# Patient Record
Sex: FEMALE | Race: WHITE | NOT HISPANIC OR LATINO | Employment: FULL TIME | URBAN - METROPOLITAN AREA
[De-identification: names, ages, dates, MRNs, and addresses within clinical notes are randomized per-mention and may not be internally consistent; named-entity substitution may affect disease eponyms.]

---

## 2019-11-18 ENCOUNTER — EVALUATION (OUTPATIENT)
Dept: PHYSICAL THERAPY | Facility: CLINIC | Age: 39
End: 2019-11-18
Payer: COMMERCIAL

## 2019-11-18 DIAGNOSIS — R51.9 LEFT FACIAL PAIN: Primary | ICD-10-CM

## 2019-11-18 DIAGNOSIS — M26.609 TMJ DYSFUNCTION: ICD-10-CM

## 2019-11-18 PROCEDURE — 97140 MANUAL THERAPY 1/> REGIONS: CPT

## 2019-11-18 PROCEDURE — 97161 PT EVAL LOW COMPLEX 20 MIN: CPT

## 2019-11-18 PROCEDURE — 97110 THERAPEUTIC EXERCISES: CPT

## 2019-11-18 RX ORDER — LEVOTHYROXINE SODIUM 0.03 MG/1
25 TABLET ORAL DAILY
COMMUNITY

## 2019-11-18 NOTE — LETTER
2019    Raphael Ramon DDS  [address]    Patient: Laverne Brooks   YOB: 1980   Date of Visit: 2019     Encounter Diagnosis     ICD-10-CM    1  Left facial pain R51    2  TMJ dysfunction M26 609        Dear Dr Ifeanyi Graham:    Thank you for your recent referral of Laverne Brooks  Please review the attached evaluation summary from Porsche's recent visit  Please verify that you agree with the plan of care by signing the attached order  If you have any questions or concerns, please do not hesitate to call  I sincerely appreciate the opportunity to share in the care of one of your patients and hope to have another opportunity to work with you in the near future  Sincerely,    Arielle Mares, PT      Referring Provider:      I certify that I have read the below Plan of Care and certify the need for these services furnished under this plan of treatment while under my care  Raphael Ramon DDS  [place]  [address]: [phone]          PT Evaluation     Today's date: 2019  Patient name: Laverne Brooks  : 1980  MRN: [id number]  Referring provider: LIA Luna  Dx:   Encounter Diagnosis     ICD-10-CM    1  Left facial pain R51    2  TMJ dysfunction M26 609        Start Time: 36  Stop Time: 1730  Total time in clinic (min): 60 minutes    Assessment  Assessment details: Patient is a 36 yo female presenting to physical therapy with symptoms consistent with L sided TMJ dysfunction/fascial pain that started a few months ago  Patient presents with poor posture, decreased shoulder and scapular strength, restrictions in TMJ opening and lateral deviations to the R and decreased muscular endurance  Patient has increased pain with chewing tough/hard foods, repetitive chewing, yawning, increased opening at the mandible and chewing gum   PT educated the patient on proper posture mechanics and in proper posture the patient notes less jaw pain  PT will address the noted impairments by performing cervical and TMJ strengthening, stretching, posture training, functional activities and manual techniques to allow the patient to return to her PLOF  PT recommended 2x/week for 4-6 weeks c a good prognosis 2* PLOF  Impairments: abnormal or restricted ROM, activity intolerance, impaired physical strength, lacks appropriate home exercise program, pain with function, poor posture  and poor body mechanics    Symptom irritability: lowUnderstanding of Dx/Px/POC: good   Prognosis: good    Goals  STG: In four weeks the patient will:    1  Be (I) with her HEP  2  Increase shoulder and scapular strength to 4+/5 MMT score to assist c ADLs  3  Increase jaw opening ROM by 5 degrees to assist c eating and flosssing  4  Performed proper posture for 75% of the session with 1-2 cues  LTG: In six weeks, the patient will:    1  Increase FOTO score to 89 to demonstrate improvements in symptoms and function  2  Demonstrate full jaw opening without pain  3  Perform 15 mins of chewing without pain  4  Increase shoulder and scapular strength to 5/5 MMT score to assist c prolonged activities  5  Performed proper posture for 100% of the session without cues         Plan  Patient would benefit from: skilled physical therapy  Referral necessary: No  Planned modality interventions: cryotherapy and thermotherapy: hydrocollator packs  Planned therapy interventions: abdominal trunk stabilization, manual therapy, joint mobilization, massage, neuromuscular re-education, patient education, postural training, strengthening, stretching, therapeutic activities, therapeutic exercise, home exercise program, functional ROM exercises and body mechanics training  Frequency: 2x week  Duration in visits: 12  Duration in weeks: 6  Plan of Care beginning date: 11/18/2019  Plan of Care expiration date: 2019  Treatment plan discussed with: patient        Subjective Evaluation    History of Present Illness  Mechanism of injury: Patient noted a hx of braces from  -   Patient noted popping and some jaw pain prior to braces  Patient noted a hx of a route canal prior to braces and pain 2* prolonged opening  Patient had braces off and the pain returned on the L side 9-10 months post  Patient has increased pain with chewing pizza crust and tougher pieces of meat  She also notes pain with opening her mouth to take a full bite, flossing, repetitive chewing, chewing gum and hard foods  No pain with brushing  Patient  wears her retainer every night and notes no hx of teeth grinding  She does feel her jaw clench throughout the day  Patient noted a few weeks ago her L shoulder is painful when she wakes up  Recurrent probem    Quality of life: good    Pain  Current pain ratin  At best pain ratin  At worst pain ratin  Location: L TMJ  Quality: sharp  Relieving factors: medications  Aggravating factors: eating  Progression: worsening    Social Support  Lives in: apartment  Lives with: young children (15 yo son)    Employment status: working ([employer])  Hand dominance: right  Exercise history: Exercise video, walking    Treatments  Treatments tried: Braces  Patient Goals  Patient goals for therapy: decreased pain, increased motion, increased strength, return to sport/leisure activities and independence with ADLs/IADLs  Patient goal: "to open without pain "        Objective     Postural Observations  Seated posture: poor  Standing posture: poor  Correction of posture: makes symptoms better    Additional Postural Observation Details  Improved posture decreases her jaw pain  PT educated the patient on a lumbar roll while seated       Neurological Testing     Sensation   Cervical/Thoracic   Left   Intact: light touch    Right   Intact: light touch    Active Range of Motion   Cervical/Thoracic Spine Cervical    Flexion:  Restriction level: minimal  Extension:  Restriction level: minimal  Left lateral flexion:  Restriction level: moderate  Right lateral flexion:  Restriction level moderate  Left rotation:  Restriction level: minimal  Right rotation:  Restriction level: minimal    Strength/Myotome Testing     Left Shoulder     Planes of Motion   Flexion: 4   Abduction: 4   External rotation at 0°:  4   Internal rotation at 0°:  4+     Right Shoulder     Planes of Motion   Flexion: 4   Abduction: 4   External rotation at 0°:  4   Internal rotation at 0°:  4+     Left Elbow   Flexion: 4+  Extension: 4+    Right Elbow   Flexion: 4+  Extension: 4+    Left Wrist/Hand   Wrist extension: 4+  Wrist flexion: 4+    Right Wrist/Hand   Wrist extension: 4+  Wrist flexion: 4+    General Comments:      Shoulder Comments   WNL  TMJ   Jaw observations: facial symmetry within normal limits  good oral hygiene  Joint sounds left: popping  Lateral bite test, right: pain on left  ROM: limited and pain with movement  Opening (mm): 36   Lateral excursion, left (mm): 20 and pain  Lateral excursion, right (mm)t: 10       Flowsheet Rows      Most Recent Value   PT/OT G-Codes   Current Score  89   Projected Score  88   FOTO information reviewed  Yes   Assessment Type  Evaluation   G code set  Self-Care             Precautions: none      Manual  11/18            TMJ distraction and ant mob MW  grade II            TMJ STM (ptyergoids) MW            Cervical upglide/downglide nv            Sub-occipital release nv                             Exercise Diary  11/18            UBE  nv            pec stretch 5x15"            Open books 5x15" ea            Scapular retraction nv            Chin tuck nv            Serratus punch nv            sidelying ER nv            TA nv            TA+ hip add nv            TA + hip abd nv Modalities  11/18            HP/CP PRN np

## 2019-11-18 NOTE — PROGRESS NOTES
PT Evaluation     Today's date: 2019  Patient name: Diogo Clarke  : 1980  MRN: [id number]  Referring provider: LIA Thayer  Dx:   Encounter Diagnosis     ICD-10-CM    1  Left facial pain R51    2  TMJ dysfunction M26 609        Start Time: 36  Stop Time: 1730  Total time in clinic (min): 60 minutes    Assessment  Assessment details: Patient is a 38 yo female presenting to physical therapy with symptoms consistent with L sided TMJ dysfunction/fascial pain that started a few months ago  Patient presents with poor posture, decreased shoulder and scapular strength, restrictions in TMJ opening and lateral deviations to the R and decreased muscular endurance  Patient has increased pain with chewing tough/hard foods, repetitive chewing, yawning, increased opening at the mandible and chewing gum  PT educated the patient on proper posture mechanics and in proper posture the patient notes less jaw pain  PT will address the noted impairments by performing cervical and TMJ strengthening, stretching, posture training, functional activities and manual techniques to allow the patient to return to her PLOF  PT recommended 2x/week for 4-6 weeks c a good prognosis 2* PLOF  Impairments: abnormal or restricted ROM, activity intolerance, impaired physical strength, lacks appropriate home exercise program, pain with function, poor posture  and poor body mechanics    Symptom irritability: lowUnderstanding of Dx/Px/POC: good   Prognosis: good    Goals  STG: In four weeks the patient will:    1  Be (I) with her HEP  2  Increase shoulder and scapular strength to 4+/5 MMT score to assist c ADLs  3  Increase jaw opening ROM by 5 degrees to assist c eating and flosssing  4  Performed proper posture for 75% of the session with 1-2 cues  LTG: In six weeks, the patient will:    1  Increase FOTO score to 89 to demonstrate improvements in symptoms and function    2  Demonstrate full jaw opening without pain  3  Perform 15 mins of chewing without pain  4  Increase shoulder and scapular strength to 5/5 MMT score to assist c prolonged activities  5  Performed proper posture for 100% of the session without cues  Plan  Patient would benefit from: skilled physical therapy  Referral necessary: No  Planned modality interventions: cryotherapy and thermotherapy: hydrocollator packs  Planned therapy interventions: abdominal trunk stabilization, manual therapy, joint mobilization, massage, neuromuscular re-education, patient education, postural training, strengthening, stretching, therapeutic activities, therapeutic exercise, home exercise program, functional ROM exercises and body mechanics training  Frequency: 2x week  Duration in visits: 12  Duration in weeks: 6  Plan of Care beginning date: 2019  Plan of Care expiration date: 2019  Treatment plan discussed with: patient        Subjective Evaluation    History of Present Illness  Mechanism of injury: Patient noted a hx of braces from  -   Patient noted popping and some jaw pain prior to braces  Patient noted a hx of a route canal prior to braces and pain 2* prolonged opening  Patient had braces off and the pain returned on the L side 9-10 months post  Patient has increased pain with chewing pizza crust and tougher pieces of meat  She also notes pain with opening her mouth to take a full bite, flossing, repetitive chewing, chewing gum and hard foods  No pain with brushing  Patient  wears her retainer every night and notes no hx of teeth grinding  She does feel her jaw clench throughout the day  Patient noted a few weeks ago her L shoulder is painful when she wakes up             Recurrent probem    Quality of life: good    Pain  Current pain ratin  At best pain ratin  At worst pain ratin  Location: L TMJ  Quality: sharp  Relieving factors: medications  Aggravating factors: eating  Progression: worsening    Social Support  Lives in: apartment  Lives with: young children (17 yo son)    Employment status: working ([employer] )  Hand dominance: right  Exercise history: Exercise video, walking    Treatments  Treatments tried: Braces  Patient Goals  Patient goals for therapy: decreased pain, increased motion, increased strength, return to sport/leisure activities and independence with ADLs/IADLs  Patient goal: "to open without pain "        Objective     Postural Observations  Seated posture: poor  Standing posture: poor  Correction of posture: makes symptoms better    Additional Postural Observation Details  Improved posture decreases her jaw pain  PT educated the patient on a lumbar roll while seated       Neurological Testing     Sensation   Cervical/Thoracic   Left   Intact: light touch    Right   Intact: light touch    Active Range of Motion   Cervical/Thoracic Spine       Cervical    Flexion:  Restriction level: minimal  Extension:  Restriction level: minimal  Left lateral flexion:  Restriction level: moderate  Right lateral flexion:  Restriction level moderate  Left rotation:  Restriction level: minimal  Right rotation:  Restriction level: minimal    Strength/Myotome Testing     Left Shoulder     Planes of Motion   Flexion: 4   Abduction: 4   External rotation at 0°: 4   Internal rotation at 0°: 4+     Right Shoulder     Planes of Motion   Flexion: 4   Abduction: 4   External rotation at 0°: 4   Internal rotation at 0°: 4+     Left Elbow   Flexion: 4+  Extension: 4+    Right Elbow   Flexion: 4+  Extension: 4+    Left Wrist/Hand   Wrist extension: 4+  Wrist flexion: 4+    Right Wrist/Hand   Wrist extension: 4+  Wrist flexion: 4+    General Comments:      Shoulder Comments   WNL  TMJ   Jaw observations: facial symmetry within normal limits  good oral hygiene  Joint sounds left: popping  Lateral bite test, right: pain on left  ROM: limited and pain with movement  Opening (mm): 36   Lateral excursion, left (mm): 20 and pain  Lateral excursion, right (mm)t: 10       Flowsheet Rows      Most Recent Value   PT/OT G-Codes   Current Score  89   Projected Score  88   FOTO information reviewed  Yes   Assessment Type  Evaluation   G code set  Self-Care             Precautions: none      Manual  11/18            TMJ distraction and ant mob MW  grade II            TMJ STM (ptyergoids) MW            Cervical upglide/downglide nv            Sub-occipital release nv                             Exercise Diary  11/18            UBE  nv            pec stretch 5x15"            Open books 5x15" ea            Scapular retraction nv            Chin tuck nv            Serratus punch nv            sidelying ER nv            TA nv            TA+ hip add nv            TA + hip abd nv                                                                                                                                                  Modalities  11/18            HP/CP PRN np

## 2019-11-20 ENCOUNTER — OFFICE VISIT (OUTPATIENT)
Dept: PHYSICAL THERAPY | Facility: CLINIC | Age: 39
End: 2019-11-20
Payer: COMMERCIAL

## 2019-11-20 ENCOUNTER — TRANSCRIBE ORDERS (OUTPATIENT)
Dept: PHYSICAL THERAPY | Facility: CLINIC | Age: 39
End: 2019-11-20

## 2019-11-20 DIAGNOSIS — G89.29 CHRONIC PAIN OF LEFT ANKLE: ICD-10-CM

## 2019-11-20 DIAGNOSIS — M26.609 TMJ DYSFUNCTION: ICD-10-CM

## 2019-11-20 DIAGNOSIS — R51.9 LEFT FACIAL PAIN: ICD-10-CM

## 2019-11-20 DIAGNOSIS — M25.572 ARTHRALGIA OF LEFT FOOT: ICD-10-CM

## 2019-11-20 DIAGNOSIS — R51.9 LEFT FACIAL PAIN: Primary | ICD-10-CM

## 2019-11-20 DIAGNOSIS — M25.572 CHRONIC PAIN OF LEFT ANKLE: ICD-10-CM

## 2019-11-20 PROCEDURE — 97140 MANUAL THERAPY 1/> REGIONS: CPT

## 2019-11-20 PROCEDURE — 97110 THERAPEUTIC EXERCISES: CPT

## 2019-11-20 PROCEDURE — 97112 NEUROMUSCULAR REEDUCATION: CPT

## 2019-11-20 NOTE — PROGRESS NOTES
Daily Note     Today's date: 2019  Patient name: Gwyn Sky  : 1980  MRN: [id number]  Referring provider: LIA García  Dx:   Encounter Diagnosis     ICD-10-CM    1  Left facial pain R51    2  TMJ dysfunction M26 609        Start Time: 1600  Stop Time: 1700  Total time in clinic (min): 60 minutes    Subjective: Patient noted that she was a little sore after last session, however no increased pain  Patient noted she chewed a cherry tomato today at lunch and noted pain  Objective: See treatment diary below      Assessment: Patient presented with L lateral deviation prior to manual techniques  Post manuals the patient presented with a fluid opening and no lateral deviation  PT performed sub-occipital release with increased tissue tightness on the L when compared to the R  Patient performed various strengthening and stretching exercises to assist c posture and pain levels  Patient required cues throughout the session  Patient would benefit from continued PT to allow the patient to return to her PLOF  Plan: Continue per plan of care        Precautions: none      Manual             TMJ distraction and ant mob MW  grade II MW  Grade  II           TMJ STM (ptyergoids) MW MW           Cervical upglide/downglide nv            Sub-occipital release nv MW                            Exercise Diary             UBE  nv 10' retro           pec stretch 5x15" 5x15"            Open books 5x15" ea 5x15" ea           Scapular retraction nv 2x10  5" hold           Chin tuck nv 2x10  5" hold           Serratus punch nv nv           sidelying ER nv nv           TA nv nv           TA+ hip add nv            TA + hip abd nv                                                                                                                                                  Modalities              HP/CP PRN np

## 2019-11-25 ENCOUNTER — OFFICE VISIT (OUTPATIENT)
Dept: PHYSICAL THERAPY | Facility: CLINIC | Age: 39
End: 2019-11-25
Payer: COMMERCIAL

## 2019-11-25 DIAGNOSIS — R51.9 LEFT FACIAL PAIN: Primary | ICD-10-CM

## 2019-11-25 DIAGNOSIS — M26.609 TMJ DYSFUNCTION: ICD-10-CM

## 2019-11-25 PROCEDURE — 97140 MANUAL THERAPY 1/> REGIONS: CPT

## 2019-11-25 PROCEDURE — 97112 NEUROMUSCULAR REEDUCATION: CPT

## 2019-11-25 PROCEDURE — 97110 THERAPEUTIC EXERCISES: CPT

## 2019-11-25 NOTE — PROGRESS NOTES
Daily Note     Today's date: 2019  Patient name: Abigail Valencia  : 1980  MRN: [id number]  Referring provider: LIA Estrella  Dx:   Encounter Diagnosis     ICD-10-CM    1  Left facial pain R51    2  TMJ dysfunction M26 609        Start Time: 1600  Stop Time: 6174  Total time in clinic (min): 55 minutes    Subjective: Patient noted that her jaw does not feel any worse  Patient noted her L shoulder is causing her some pain  Objective: See treatment diary below      Assessment: PT noted increased trigger points on the R TMJ compared to the L when assessed  Patient and PT note improvements in jaw opening and closed post TMJ manuals  PT performed post and inferior mobs to the L shoulder due to pain levels that affect her jaw  Patient presented with a tight capsule and improved with pain levels and ROM post mobilizations  PT introduced shoulder strengthening to assist c jaw pain  Patient required min VCs throughout the session  Patient would benefit from continued PT to allow the patient to return to her PLOF  Plan: Continue per plan of care        Precautions: none      Manual            TMJ distraction and ant mob MW  grade II MW  Grade  II           TMJ STM (ptyergoids) MW MW MW          Cervical upglide/downglide nv            Sub-occipital release nv MW           L shoulder post and inferior mobs   MW  Grade III              Exercise Diary            UBE  nv 10' retro 10' retro          pec stretch 5x15" 5x15"  5x15"          Open books 5x15" ea 5x15" ea 5x15" ea          Scapular retraction nv 2x10  5" hold 2x10  5" hold          Chin tuck nv 2x10  5" hold 2x10  5" hold          Serratus punch nv nv 2x10          sidelying ER nv nv 2x10 ea          TA nv nv nv          TA+ hip add nv            TA + hip abd nv Modalities  11/18            HP/CP PRN np

## 2019-11-26 ENCOUNTER — APPOINTMENT (OUTPATIENT)
Dept: PHYSICAL THERAPY | Facility: CLINIC | Age: 39
End: 2019-11-26
Payer: COMMERCIAL

## 2019-11-27 ENCOUNTER — OFFICE VISIT (OUTPATIENT)
Dept: PHYSICAL THERAPY | Facility: CLINIC | Age: 39
End: 2019-11-27
Payer: COMMERCIAL

## 2019-11-27 DIAGNOSIS — M26.609 TMJ DYSFUNCTION: ICD-10-CM

## 2019-11-27 DIAGNOSIS — R51.9 LEFT FACIAL PAIN: Primary | ICD-10-CM

## 2019-11-27 PROCEDURE — 97112 NEUROMUSCULAR REEDUCATION: CPT | Performed by: PHYSICAL THERAPIST

## 2019-11-27 PROCEDURE — 97140 MANUAL THERAPY 1/> REGIONS: CPT | Performed by: PHYSICAL THERAPIST

## 2019-11-27 PROCEDURE — 97110 THERAPEUTIC EXERCISES: CPT | Performed by: PHYSICAL THERAPIST

## 2019-11-27 NOTE — PROGRESS NOTES
Daily Note     Today's date: 2019  Patient name: Diogo Clarke  : 1980  MRN: [id number]  Referring provider: LIA Thayer  Dx:   Encounter Diagnosis     ICD-10-CM    1  Left facial pain R51    2  TMJ dysfunction M26 609                   Subjective: patient reports 'it is feeling pretty good today'      Objective: See treatment diary below      Assessment: Patient tolerated treatment well  She required minimal verbal cues throughout the session  she mentions open books are getting easier on the L side  She reports some relief of L shoulder discomfort after manual therapy  She will benefit from continued PT  Plan: Continue per plan of care        Precautions: none      Manual           TMJ distraction and ant mob MW  grade II MW  Grade  II           TMJ STM (ptyergoids) MW MW MW          Cervical upglide/downglide nv            Sub-occipital release nv MW  SK         L shoulder post and inferior mobs   MW  Grade III SK Grade III             Exercise Diary           UBE  nv 10' retro 10' retro 10' retro         pec stretch 5x15" 5x15"  5x15" 5x15"         Open books 5x15" ea 5x15" ea 5x15" ea 5x15" ea          Scapular retraction nv 2x10  5" hold 2x10  5" hold 2x10  5" hold         Chin tuck nv 2x10  5" hold 2x10  5" hold 2x10  5" hold         Serratus punch nv nv 2x10 3x10         sidelying ER nv nv 2x10 ea 2x10 ea         TA nv nv nv 10x 5" hold         TA+ hip add nv            TA + hip abd nv                                                                                                                                                  Modalities              HP/CP PRN np

## 2019-12-02 ENCOUNTER — APPOINTMENT (OUTPATIENT)
Dept: PHYSICAL THERAPY | Facility: CLINIC | Age: 39
End: 2019-12-02
Payer: COMMERCIAL

## 2019-12-02 ENCOUNTER — EVALUATION (OUTPATIENT)
Dept: PHYSICAL THERAPY | Facility: CLINIC | Age: 39
End: 2019-12-02
Payer: COMMERCIAL

## 2019-12-02 DIAGNOSIS — M25.572 CHRONIC PAIN OF LEFT ANKLE: Primary | ICD-10-CM

## 2019-12-02 DIAGNOSIS — G89.29 CHRONIC PAIN OF LEFT ANKLE: Primary | ICD-10-CM

## 2019-12-02 DIAGNOSIS — M25.572 ARTHRALGIA OF LEFT FOOT: ICD-10-CM

## 2019-12-02 PROCEDURE — 97164 PT RE-EVAL EST PLAN CARE: CPT

## 2019-12-02 NOTE — PROGRESS NOTES
PT Re-Evaluation     Today's date: 2019  Patient name: Capri Moore  : 1980  MRN: [id number]  Referring provider: Yessica Hough DPM  Dx:   Encounter Diagnosis     ICD-10-CM    1  Chronic pain of left ankle M25 572     G89 29    2  Arthralgia of left foot M25 572        Start Time: 1600  Stop Time: 1700  Total time in clinic (min): 60 minutes    Assessment  Assessment details: Patient is a 36 yo female presenting to physical therapy with pain on the plantar aspect of the L foot and distal achilles tendon that started on 10/5/19 after walking in [place]  Patient noted prior to the pain she was running 2-3 miles a day without pain  Patient presents with decreased L ankle ROM, hypomobility between the joints of the forefoot, decreased plantarflexion and inversion strength and gait deviations  Patient amb with a toe to heel pattern rather than heel to toe  Due to these gait deviations and her high arches she does not absorb shock well when amb and it may cause pain  Patient has increased pain with walking, negotiating stairs, driving a stick shift car and running  PT will address the noted impairments by performing ankle and knee strengthening, stretching, balance, functional activities and manual techniques to allow the patient to return to her PLOF  PT recommended 2x/week for 4-6 weeks c a good prognosis 2* noted improvements post treatment at today's session  Impairments: abnormal gait, abnormal or restricted ROM, activity intolerance, impaired balance, impaired physical strength, lacks appropriate home exercise program and pain with function    Symptom irritability: lowUnderstanding of Dx/Px/POC: good   Prognosis: good    Goals  STG: In four weeks the patient will:    1  Be (I) with her HEP  2  Increase hip,knee and ankle strength to 4+/5 MMT score to assist c stairs and driving  3  Increase L ankle ROM by 5 degrees to assist c jogging  LTG: In six weeks, the patient will:    1  Increase FOTO score to 80 to demonstrate improvements in symptoms and function  2  Demonstrate full L ankle AROM without pain  3  Perform 2-3 miles of jogging without pain  4  Increase hip,knee and ankle strength to 5/5 MMT score to assist c prolonged jogging  Plan  Patient would benefit from: skilled physical therapy  Referral necessary: No  Planned modality interventions: cryotherapy and thermotherapy: hydrocollator packs  Planned therapy interventions: abdominal trunk stabilization, joint mobilization, manual therapy, massage, Calvert taping, neuromuscular re-education, patient education, strengthening, stretching, therapeutic activities, therapeutic exercise, home exercise program, functional ROM exercises, gait training, body mechanics training, balance and balance/weight bearing training  Frequency: 2x week  Duration in visits: 10  Duration in weeks: 5  Plan of Care beginning date: 2019  Plan of Care expiration date: 2019  Treatment plan discussed with: patient        Subjective Evaluation    History of Present Illness  Mechanism of injury: Patient noted on 10/5/19 she noted pain in the L ball of her foot  Patient noted that day she was in [place] and she was doing a lot of walking at the time  Patient noted that it got worse as the day went on  Patient noted no numbness or tingling in the foot  Patient noted that she had about a 6-7/10 pain when she initially injured it  Patient has increased pain with walking on the push off phase, during stair negotiation and when shifting her clutch in her car  Patient was running 2-3 miles a day during the week and 5-6 miles on the weekends  Patient noted she was running in old [employer], then switched to [employer] and started to note achilles pain             Recurrent probem    Quality of life: good    Pain  Current pain ratin  At best pain ratin  At worst pain rating: 3  Location: L ball of foot and achilles   Quality: sharp  Aggravating factors: walking, standing and stair climbing  Progression: improved    Social Support  Steps to enter house: no  Stairs in house: no   Lives in: apartment  Lives with: young children    Employment status: working  Patient Goals  Patient goals for therapy: increased strength, independence with ADLs/IADLs, return to sport/leisure activities, decreased pain, improved balance and increased motion  Patient goal: "to run 2-3 miles without pain "        Objective     Observations     Additional Observation Details  Patient amb the following gait deviations: toe heel pattern, high arch  Patient presents with a callus on the ball of her L foot under the 2nd Metatarsal head  Neurological Testing     Sensation     Ankle/Foot   Left Ankle/Foot   Intact: light touch    Right Ankle/Foot   Intact: light touch     Active Range of Motion   Left Ankle/Foot   Dorsiflexion (ke): 3 degrees   Plantar flexion: 50 degrees   Inversion: 23 degrees   Eversion: 10 degrees     Right Ankle/Foot   Dorsiflexion (ke): 6 degrees   Plantar flexion: 50 degrees   Inversion: 26 degrees   Eversion: 10 degrees     Strength/Myotome Testing     Left Hip   Planes of Motion   Flexion: 4  Abduction: 4  Adduction: 4    Right Hip   Planes of Motion   Flexion: 4  Abduction: 4  Adduction: 4    Left Ankle/Foot   Dorsiflexion: 4  Plantar flexion: 4-  Inversion: 4-  Eversion: 4    Right Ankle/Foot   Dorsiflexion: 4+  Plantar flexion: 4  Inversion: 4+  Eversion: 4+    Additional Strength Details  Knee flexion: 4/5  Knee extension: 4+/5    Tests   Left Ankle/Foot   Negative for navicular drop, syndesmosis squeeze and Tinel's sign (tarsal tunnel)              Precautions: none      Manual  12/2            L ankle distraction and talocural post mob nv            Metatarsal ant/post mob nv            Metatarsal sweeping mob nv            L great toe distraction nv            Re-eval MW                Exercise Diary  12/2            Bike nv            Calf and soleus stretch nv            Heel raises nv            Great toe stretch (extension) nv            4 way ankle nv            FTEO/FTEC nv            SLS nv                                                                                                                                                                                         Modalities  12/2            HP/CP PRN np

## 2019-12-02 NOTE — LETTER
2019    Radha Flanagan DPM  [address]    Patient: jL Ryan   YOB: 1980   Date of Visit: 2019     Encounter Diagnosis     ICD-10-CM    1  Chronic pain of left ankle M25 572     G89 29    2  Arthralgia of left foot M25 572        Dear Dr Lavetta Epley: Thank you for your recent referral of Lj Ryan  Please review the attached evaluation summary from Porsche's recent visit  Please verify that you agree with the plan of care by signing the attached order  If you have any questions or concerns, please do not hesitate to call  I sincerely appreciate the opportunity to share in the care of one of your patients and hope to have another opportunity to work with you in the near future  Sincerely,    Yina Levy, PT      Referring Provider:      I certify that I have read the below Plan of Care and certify the need for these services furnished under this plan of treatment while under my care  Radha Flanagan DPM  [address]: [phone]          PT Re-Evaluation     Today's date: 2019  Patient name: Lj Ryan  : 1980  MRN: [id number]  Referring provider: Maliha Odell DPM  Dx:   Encounter Diagnosis     ICD-10-CM    1  Chronic pain of left ankle M25 572     G89 29    2  Arthralgia of left foot M25 572        Start Time: 1600  Stop Time: 1700  Total time in clinic (min): 60 minutes    Assessment  Assessment details: Patient is a 38 yo female presenting to physical therapy with pain on the plantar aspect of the L foot and distal achilles tendon that started on 10/5/19 after walking in [place]  Patient noted prior to the pain she was running 2-3 miles a day without pain  Patient presents with decreased L ankle ROM, hypomobility between the joints of the forefoot, decreased plantarflexion and inversion strength and gait deviations   Patient amb with a toe to heel pattern rather than heel to toe  Due to these gait deviations and her high arches she does not absorb shock well when amb and it may cause pain  Patient has increased pain with walking, negotiating stairs, driving a stick shift car and running  PT will address the noted impairments by performing ankle and knee strengthening, stretching, balance, functional activities and manual techniques to allow the patient to return to her PLOF  PT recommended 2x/week for 4-6 weeks c a good prognosis 2* noted improvements post treatment at today's session  Impairments: abnormal gait, abnormal or restricted ROM, activity intolerance, impaired balance, impaired physical strength, lacks appropriate home exercise program and pain with function    Symptom irritability: lowUnderstanding of Dx/Px/POC: good   Prognosis: good    Goals  STG: In four weeks the patient will:    1  Be (I) with her HEP  2  Increase hip,knee and ankle strength to 4+/5 MMT score to assist c stairs and driving  3  Increase L ankle ROM by 5 degrees to assist c jogging  LTG: In six weeks, the patient will:    1  Increase FOTO score to 80 to demonstrate improvements in symptoms and function  2  Demonstrate full L ankle AROM without pain  3  Perform 2-3 miles of jogging without pain  4  Increase hip,knee and ankle strength to 5/5 MMT score to assist c prolonged jogging         Plan  Patient would benefit from: skilled physical therapy  Referral necessary: No  Planned modality interventions: cryotherapy and thermotherapy: hydrocollator packs  Planned therapy interventions: abdominal trunk stabilization, joint mobilization, manual therapy, massage, Calvert taping, neuromuscular re-education, patient education, strengthening, stretching, therapeutic activities, therapeutic exercise, home exercise program, functional ROM exercises, gait training, body mechanics training, balance and balance/weight bearing training  Frequency: 2x week  Duration in visits: 10  Duration in weeks: 5  Plan of Care beginning date: 2019  Plan of Care expiration date: 2019  Treatment plan discussed with: patient        Subjective Evaluation    History of Present Illness  Mechanism of injury: Patient noted on 10/5/19 she noted pain in the L ball of her foot  Patient noted that day she was in [place] and she was doing a lot of walking at the time  Patient noted that it got worse as the day went on  Patient noted no numbness or tingling in the foot  Patient noted that she had about a 6-7/10 pain when she initially injured it  Patient has increased pain with walking on the push off phase, during stair negotiation and when shifting her clutch in her car  Patient was running 2-3 miles a day during the week and 5-6 miles on the weekends  Patient noted she was running in old sneakers, then switched to [employer] and started to note achilles pain  Recurrent probem    Quality of life: good    Pain  Current pain ratin  At best pain ratin  At worst pain rating: 3  Location: L ball of foot and achilles   Quality: sharp  Aggravating factors: walking, standing and stair climbing  Progression: improved    Social Support  Steps to enter house: no  Stairs in house: no   Lives in: apartment  Lives with: young children    Employment status: working  Patient Goals  Patient goals for therapy: increased strength, independence with ADLs/IADLs, return to sport/leisure activities, decreased pain, improved balance and increased motion  Patient goal: "to run 2-3 miles without pain "        Objective     Observations     Additional Observation Details  Patient amb the following gait deviations: toe heel pattern, high arch  Patient presents with a callus on the ball of her L foot under the 2nd Metatarsal head        Neurological Testing     Sensation     Ankle/Foot   Left Ankle/Foot   Intact: light touch    Right Ankle/Foot   Intact: light touch     Active Range of Motion   Left Ankle/Foot   Dorsiflexion (ke): 3 degrees   Plantar flexion: 50 degrees   Inversion: 23 degrees   Eversion: 10 degrees     Right Ankle/Foot   Dorsiflexion (ke): 6 degrees   Plantar flexion: 50 degrees   Inversion: 26 degrees   Eversion: 10 degrees     Strength/Myotome Testing     Left Hip   Planes of Motion   Flexion: 4  Abduction: 4  Adduction: 4    Right Hip   Planes of Motion   Flexion: 4  Abduction: 4  Adduction: 4    Left Ankle/Foot   Dorsiflexion: 4  Plantar flexion: 4-  Inversion: 4-  Eversion: 4    Right Ankle/Foot   Dorsiflexion: 4+  Plantar flexion: 4  Inversion: 4+  Eversion: 4+    Additional Strength Details  Knee flexion: 4/5  Knee extension: 4+/5    Tests   Left Ankle/Foot   Negative for navicular drop, syndesmosis squeeze and Tinel's sign (tarsal tunnel)              Precautions: none      Manual  12/2            L ankle distraction and talocural post mob nv            Metatarsal ant/post mob nv            Metatarsal sweeping mob nv            L great toe distraction nv            Re-eval MW                Exercise Diary  12/2            Bike nv            Calf and soleus stretch nv            Heel raises nv            Great toe stretch (extension) nv            4 way ankle nv            FTEO/FTEC nv            SLS nv                                                                                                                                                                                         Modalities  12/2            HP/CP PRN np

## 2019-12-04 ENCOUNTER — OFFICE VISIT (OUTPATIENT)
Dept: PHYSICAL THERAPY | Facility: CLINIC | Age: 39
End: 2019-12-04
Payer: COMMERCIAL

## 2019-12-04 DIAGNOSIS — M25.572 CHRONIC PAIN OF LEFT ANKLE: Primary | ICD-10-CM

## 2019-12-04 DIAGNOSIS — M26.609 TMJ DYSFUNCTION: ICD-10-CM

## 2019-12-04 DIAGNOSIS — M25.572 ARTHRALGIA OF LEFT FOOT: ICD-10-CM

## 2019-12-04 DIAGNOSIS — G89.29 CHRONIC PAIN OF LEFT ANKLE: Primary | ICD-10-CM

## 2019-12-04 DIAGNOSIS — R51.9 LEFT FACIAL PAIN: ICD-10-CM

## 2019-12-04 PROCEDURE — 97112 NEUROMUSCULAR REEDUCATION: CPT

## 2019-12-04 PROCEDURE — 97110 THERAPEUTIC EXERCISES: CPT

## 2019-12-04 PROCEDURE — 97140 MANUAL THERAPY 1/> REGIONS: CPT

## 2019-12-04 NOTE — PROGRESS NOTES
Daily Note     Today's date: 2019  Patient name: Amara Cardona  : 1980  MRN: [id number]  Referring provider: LIA Vargas  Dx:   Encounter Diagnosis     ICD-10-CM    1  Chronic pain of left ankle M25 572     G89 29    2  Arthralgia of left foot M25 572    3  Left facial pain R51    4  TMJ dysfunction M26 609        Start Time: 1610  Stop Time: 1700  Total time in clinic (min): 50 minutes    Subjective: Patient noted that she was able to chew a cherry with less pain than usual  Patient noted that she ate a big sandwich and was bothered by it  Objective: See treatment diary below      Assessment: Patient continues to improve with joint mobility in the TMJ post manuals and does not have as much pain with chewing  PT educated the patient on not chewing gum  Patient required min VCs for 4 way ankle exercise  Patient would benefit from continued PT to allow the patient to return to her PLOF  Plan: Continue per plan of care        Precautions: none    Ankle    Manual             L ankle distraction and talocural post mob nv            Metatarsal ant/post mob nv            Metatarsal sweeping mob nv            L great toe distraction nv            Re-eval MW                Exercise Diary             Bike nv            Calf and soleus stretch nv 3x30" ea           Heel raises nv 2x10           Great toe stretch (extension) nv            4 way ankle nv RTB  2x10 ea           FTEO/FTEC nv            SLS nv                                                                                                                                                                                         Modalities              HP/CP PRN np                                         TMJ    Manual          TMJ distraction and ant mob MW  grade II MW  Grade  II           TMJ STM (ptyergoids) MW MW MW  MW & masseter        Cervical upglide/downglide nv Sub-occipital release nv MW  SK         L shoulder post and inferior mobs   MW  Grade III SK Grade III             Exercise Diary  11/18 11/20 11/25 11/27 12/4        UBE  nv 10' retro 10' retro 10' retro 10' retro        pec stretch 5x15" 5x15"  5x15" 5x15"         Open books 5x15" ea 5x15" ea 5x15" ea 5x15" ea  5x15" ea        Scapular retraction nv 2x10  5" hold 2x10  5" hold 2x10  5" hold         Chin tuck nv 2x10  5" hold 2x10  5" hold 2x10  5" hold 2x10  5" hold        Serratus punch nv nv 2x10 3x10 3x10        sidelying ER nv nv 2x10 ea 2x10 ea         TA nv nv nv 10x 5" hold         TA+ hip add nv            TA + hip abd nv                                                                                                                                                  Modalities  11/18            HP/CP PRN np

## 2019-12-09 ENCOUNTER — APPOINTMENT (OUTPATIENT)
Dept: PHYSICAL THERAPY | Facility: CLINIC | Age: 39
End: 2019-12-09
Payer: COMMERCIAL

## 2019-12-09 ENCOUNTER — OFFICE VISIT (OUTPATIENT)
Dept: PHYSICAL THERAPY | Facility: CLINIC | Age: 39
End: 2019-12-09
Payer: COMMERCIAL

## 2019-12-09 DIAGNOSIS — M25.572 ARTHRALGIA OF LEFT FOOT: ICD-10-CM

## 2019-12-09 DIAGNOSIS — M26.609 TMJ DYSFUNCTION: ICD-10-CM

## 2019-12-09 DIAGNOSIS — M25.572 CHRONIC PAIN OF LEFT ANKLE: Primary | ICD-10-CM

## 2019-12-09 DIAGNOSIS — G89.29 CHRONIC PAIN OF LEFT ANKLE: Primary | ICD-10-CM

## 2019-12-09 DIAGNOSIS — R51.9 LEFT FACIAL PAIN: ICD-10-CM

## 2019-12-09 PROCEDURE — 97140 MANUAL THERAPY 1/> REGIONS: CPT

## 2019-12-09 PROCEDURE — 97112 NEUROMUSCULAR REEDUCATION: CPT

## 2019-12-09 PROCEDURE — 97110 THERAPEUTIC EXERCISES: CPT

## 2019-12-09 NOTE — PROGRESS NOTES
Daily Note     Today's date: 2019  Patient name: Yonny Alicia  : 1980  MRN: [id number]  Referring provider: LIA Carson  Dx:   Encounter Diagnosis     ICD-10-CM    1  Chronic pain of left ankle M25 572     G89 29    2  Arthralgia of left foot M25 572    3  Left facial pain R51    4  TMJ dysfunction M26 609        Start Time: 1600  Stop Time: 1700  Total time in clinic (min): 60 minutes    Subjective: Patient noted that she does not think about chewing a cherry tomato anymore which is an improvement  Patient noted wide opening continues to be a challenge  Patient noted that she has not performed her HEP for her ankle, however plans to  Patient noted she is very fatigued lately  Objective: See treatment diary below      Assessment: Patient continues to improve with the mechanics of opening post manuals  Patient continues to note less pain with chewing hard food which is an improvement in neuromuscular control  Patient demonstrated improvements with shoulder strength 2* increased weight without pain  Patient required min VCs for exercises  Patient would benefit from continued PT to allow the patient to return to her PLOF  Plan: Continue per plan of care        Precautions: none      Ankle    Manual            L ankle distraction and talocural post mob nv            Metatarsal ant/post mob nv            Metatarsal sweeping mob nv            L great toe distraction nv            Re-eval MW                Exercise Diary            Bike nv            Calf and soleus stretch nv 3x30" ea 3x30" ea          Heel raises nv 2x10           Great toe stretch (extension) nv            4 way ankle nv RTB  2x10 ea RTB  2x10 ea  (B)          FTEO/FTEC nv            SLS nv                                                                                                                                                                                         Modalities 12/2            HP/CP PRN np                                         TMJ    Manual  11/18 11/20 11/25 11/27 12/4 12/9       TMJ distraction and ant mob MW  grade II MW  Grade  II    MW  Grade  II       TMJ STM (ptyergoids) MW MW MW  MW & masseter MW       Cervical upglide/downglide nv            Sub-occipital release nv MW  SK         L shoulder post and inferior mobs   MW  Grade III SK Grade III             Exercise Diary  11/18 11/20 11/25 11/27 12/4 12/9       UBE  nv 10' retro 10' retro 10' retro 10' retro 10'  retro       pec stretch 5x15" 5x15"  5x15" 5x15"  5x15"       Open books 5x15" ea 5x15" ea 5x15" ea 5x15" ea  5x15" ea        Scapular retraction nv 2x10  5" hold 2x10  5" hold 2x10  5" hold         Chin tuck nv 2x10  5" hold 2x10  5" hold 2x10  5" hold 2x10  5" hold 2x10  5" hold       Serratus punch nv nv 2x10 3x10 3x10 2x10  2#       sidelying ER nv nv 2x10 ea 2x10 ea  2x10 ea  1#       TA nv nv nv 10x 5" hold         TA+ hip add nv            TA + hip abd nv                                                                                                                                                  Modalities  11/18            HP/CP PRN np

## 2019-12-11 ENCOUNTER — OFFICE VISIT (OUTPATIENT)
Dept: PHYSICAL THERAPY | Facility: CLINIC | Age: 39
End: 2019-12-11
Payer: COMMERCIAL

## 2019-12-11 DIAGNOSIS — R51.9 LEFT FACIAL PAIN: ICD-10-CM

## 2019-12-11 DIAGNOSIS — M26.609 TMJ DYSFUNCTION: ICD-10-CM

## 2019-12-11 DIAGNOSIS — G89.29 CHRONIC PAIN OF LEFT ANKLE: Primary | ICD-10-CM

## 2019-12-11 DIAGNOSIS — M25.572 CHRONIC PAIN OF LEFT ANKLE: Primary | ICD-10-CM

## 2019-12-11 DIAGNOSIS — M25.572 ARTHRALGIA OF LEFT FOOT: ICD-10-CM

## 2019-12-11 PROCEDURE — 97112 NEUROMUSCULAR REEDUCATION: CPT

## 2019-12-11 PROCEDURE — 97140 MANUAL THERAPY 1/> REGIONS: CPT

## 2019-12-11 PROCEDURE — 97110 THERAPEUTIC EXERCISES: CPT

## 2019-12-11 NOTE — PROGRESS NOTES
Daily Note     Today's date: 2019  Patient name: Daniella Negron  : 1980  MRN: [id number]  Referring provider: LIA Mason  Dx:   Encounter Diagnosis     ICD-10-CM    1  Chronic pain of left ankle M25 572     G89 29    2  Arthralgia of left foot M25 572    3  Left facial pain R51    4  TMJ dysfunction M26 609        Start Time: 1600  Stop Time: 1700  Total time in clinic (min): 60 minutes    Subjective: Patient noted that she ate a sandwich today with no pain in the jaw  Patient noted since she performed the ankle exercises at home and in the clinic she has not had any pain in the ankle  Objective: See treatment diary below      Assessment: PT noted improvements in TMJ joint mobility during mobilizations  PT noted less trigger points within the pterygoids  PT introduced balance exercises to assist c strengthening and proprioception  Patient performed with no LOB, however uses visual input to stay balanced  With her eyes closed she required 1 HHA  Patient noted no pain post session  Patient required min VCs for form throughout the session  Patient would benefit from continued PT to allow the patient to return to her PLOF  Plan: Continue per plan of care        Precautions: none      Ankle    Manual           L ankle distraction and talocural post mob nv            Metatarsal ant/post mob nv            Metatarsal sweeping mob nv            L great toe distraction nv            Re-eval MW                Exercise Diary                        Calf and soleus stretch nv 3x30" ea 3x30" ea 3x30" ea         Heel raises nv 2x10  3x10         Great toe stretch (extension) nv            4 way ankle nv RTB  2x10 ea RTB  2x10 ea  (B)          FTEO/FTEC nv   3x30" ea on foam  EC 1 HHA         SLS nv            Tandem stance on foam    3x30" ea Modalities  12/2            HP/CP PRN np                                         TMJ    Manual  11/18 11/20 11/25 11/27 12/4 12/9 12/11      TMJ distraction and ant mob MW  grade II MW  Grade  II    MW  Grade  II MW  Grade  II      TMJ STM (ptyergoids) MW MW MW  MW & masseter MW MW      Cervical upglide/downglide nv            Sub-occipital release nv MW  SK         L shoulder post and inferior mobs   MW  Grade III SK Grade III             Exercise Diary  11/18 11/20 11/25 11/27 12/4 12/9 12/11      UBE  nv 10' retro 10' retro 10' retro 10' retro 10'  retro 10' retro      pec stretch 5x15" 5x15"  5x15" 5x15"  5x15" On full foam roll  5x15"       Open books 5x15" ea 5x15" ea 5x15" ea 5x15" ea  5x15" ea        Scapular retraction nv 2x10  5" hold 2x10  5" hold 2x10  5" hold         Chin tuck nv 2x10  5" hold 2x10  5" hold 2x10  5" hold 2x10  5" hold 2x10  5" hold 3x10  5" hold      Serratus punch nv nv 2x10 3x10 3x10 2x10  2# 2x10  2#      sidelying ER nv nv 2x10 ea 2x10 ea  2x10 ea  1# 2x10 ea  1#      TA nv nv nv 10x 5" hold   2x10  5" hold      TA+ hip add nv            TA + hip abd nv                                                                                                                                                  Modalities  11/18            HP/CP PRN np

## 2019-12-16 ENCOUNTER — OFFICE VISIT (OUTPATIENT)
Dept: PHYSICAL THERAPY | Facility: CLINIC | Age: 39
End: 2019-12-16
Payer: COMMERCIAL

## 2019-12-16 ENCOUNTER — APPOINTMENT (OUTPATIENT)
Dept: PHYSICAL THERAPY | Facility: CLINIC | Age: 39
End: 2019-12-16
Payer: COMMERCIAL

## 2019-12-16 DIAGNOSIS — G89.29 CHRONIC PAIN OF LEFT ANKLE: ICD-10-CM

## 2019-12-16 DIAGNOSIS — M25.572 ARTHRALGIA OF LEFT FOOT: ICD-10-CM

## 2019-12-16 DIAGNOSIS — M25.572 CHRONIC PAIN OF LEFT ANKLE: ICD-10-CM

## 2019-12-16 DIAGNOSIS — R51.9 LEFT FACIAL PAIN: ICD-10-CM

## 2019-12-16 DIAGNOSIS — M26.609 TMJ DYSFUNCTION: Primary | ICD-10-CM

## 2019-12-16 PROCEDURE — 97110 THERAPEUTIC EXERCISES: CPT

## 2019-12-16 PROCEDURE — 97140 MANUAL THERAPY 1/> REGIONS: CPT

## 2019-12-16 PROCEDURE — 97112 NEUROMUSCULAR REEDUCATION: CPT

## 2019-12-16 NOTE — PROGRESS NOTES
Daily Note     Today's date: 2019  Patient name: Jennifer Mondragon  : 1980  MRN: [id number]  Referring provider: Gerry Gowers, D*  Dx:   Encounter Diagnosis     ICD-10-CM    1  TMJ dysfunction M26 609    2  Chronic pain of left ankle M25 572     G89 29    3  Arthralgia of left foot M25 572    4  Left facial pain R51        Start Time: 1600  Stop Time: 1700  Total time in clinic (min): 60 minutes    Subjective: Patient noted that she feels good  Patient noted that she does not have jaw pain with sandwiches, however has increased difficulty with carrots  Patient noted she wore flatter shoes today and she does not have ankle pain  Patient noted prior to PT, she would of had pain in those shoes  Patient noted she washed dishes over the weekend without pain which is an improvement  Objective: See treatment diary below      Assessment: PT noted increased jaw tension during today's session, however with trigger point release the tension decreased and she was able to perform opening with improved mechanics  PT noted increased trigger points in the sub-occipitals  Patient required min VCs for form and posture throughout the session  Patient continues to improve with strength in her core and ankle 2* no pain with walking and washing dishes  Patient would benefit from continued PT to allow the patient to return to her PLOF  Plan: Continue per plan of care        Precautions: none      Ankle    Manual          L ankle distraction and talocural post mob nv            Metatarsal ant/post mob nv            Metatarsal sweeping mob nv            L great toe distraction nv            Re-eval MW                Exercise Diary                       Calf and soleus stretch nv 3x30" ea 3x30" ea 3x30" ea 3x30" ea        Heel raises nv 2x10  3x10 3x10        Great toe stretch (extension) nv            4 way ankle nv RTB  2x10 ea RTB  2x10 ea  (B) FTEO/FTEC nv   3x30" ea on foam  EC 1 HHA         SLS nv            Tandem stance on foam    3x30" ea                                                                                                                                                                         Modalities  12/2            HP/CP PRN np                                         TMJ    Manual  11/18 11/20 11/25 11/27 12/4 12/9 12/11 12/16     TMJ distraction and ant mob MW  grade II MW  Grade  II    MW  Grade  II MW  Grade  II MW  Grade  II     TMJ STM (ptyergoids) MW MW MW  MW & masseter MW MW MW     Cervical upglide/downglide nv            Sub-occipital release nv MW  SK    MW     L shoulder post and inferior mobs   MW  Grade III SK Grade III             Exercise Diary  11/18 11/20 11/25 11/27 12/4 12/9 12/11 12/16     UBE  nv 10' retro 10' retro 10' retro 10' retro 10'  retro 10' retro 10' retro     pec stretch 5x15" 5x15"  5x15" 5x15"  5x15" On full foam roll  5x15"  5x15"     Open books 5x15" ea 5x15" ea 5x15" ea 5x15" ea  5x15" ea        Scapular retraction nv 2x10  5" hold 2x10  5" hold 2x10  5" hold         Chin tuck nv 2x10  5" hold 2x10  5" hold 2x10  5" hold 2x10  5" hold 2x10  5" hold 3x10  5" hold 3x10  5" hold     Serratus punch nv nv 2x10 3x10 3x10 2x10  2# 2x10  2# 3x10  2#     sidelying ER nv nv 2x10 ea 2x10 ea  2x10 ea  1# 2x10 ea  1# 2x10 ea  1#     TA nv nv nv 10x 5" hold   2x10  5" hold x10  5" hold     TA+ hip add nv       x10  5" hold     TA + hip abd nv                                                                                                                                                  Modalities  11/18            HP/CP PRN np

## 2019-12-18 ENCOUNTER — EVALUATION (OUTPATIENT)
Dept: PHYSICAL THERAPY | Facility: CLINIC | Age: 39
End: 2019-12-18
Payer: COMMERCIAL

## 2019-12-18 DIAGNOSIS — G89.29 CHRONIC PAIN OF LEFT ANKLE: ICD-10-CM

## 2019-12-18 DIAGNOSIS — R51.9 LEFT FACIAL PAIN: Primary | ICD-10-CM

## 2019-12-18 DIAGNOSIS — M25.572 ARTHRALGIA OF LEFT FOOT: ICD-10-CM

## 2019-12-18 DIAGNOSIS — M25.572 CHRONIC PAIN OF LEFT ANKLE: ICD-10-CM

## 2019-12-18 DIAGNOSIS — M26.609 TMJ DYSFUNCTION: ICD-10-CM

## 2019-12-18 PROCEDURE — 97110 THERAPEUTIC EXERCISES: CPT

## 2019-12-18 PROCEDURE — 97140 MANUAL THERAPY 1/> REGIONS: CPT

## 2019-12-18 PROCEDURE — 97112 NEUROMUSCULAR REEDUCATION: CPT

## 2019-12-18 NOTE — LETTER
2019    Wolfgang Allen DDS  [address]    Patient: Rigoberto Perez   YOB: 1980   Date of Visit: 2019     Encounter Diagnosis     ICD-10-CM    1  Left facial pain R51    2  TMJ dysfunction M26 609    3  Chronic pain of left ankle M25 572     G89 29    4  Arthralgia of left foot M25 572        Dear Dr Alejandra Glover:    Thank you for your recent referral of Rigoberto Perez  Please review the attached evaluation summary from Porsche's recent visit  Please verify that you agree with the plan of care by signing the attached order  If you have any questions or concerns, please do not hesitate to call  I sincerely appreciate the opportunity to share in the care of one of your patients and hope to have another opportunity to work with you in the near future  Sincerely,    Monica Moss, PT      Referring Provider:      I certify that I have read the below Plan of Care and certify the need for these services furnished under this plan of treatment while under my care  Wolfgang Allen DDS  [address]  [address] [place] [address] Avenue: [phone]          PT Re-Evaluation     Today's date: 2019  Patient name: Rigoberto Perez  : 1980  MRN: [id number]  Referring provider: LIA Ruiz  Dx:   Encounter Diagnosis     ICD-10-CM    1  Left facial pain R51    2  TMJ dysfunction M26 609    3  Chronic pain of left ankle M25 572     G89 29    4  Arthralgia of left foot M25 572        Start Time: 161  Stop Time: 1715  Total time in clinic (min): 60 minutes    Assessment  Assessment details: Since starting physical therapy for her L sided jaw pain, the patient has improved with pain levels in the jaw, joint mobility with opening, improved posture and increased strength in her shoulders  Patient is able to eat a cherry tomato and a burger with very minimal pain, however has pain with baby carrots   Patient was also evaluated for her foot 2* increased pain with jogging and had a signed POC by a her podiatrist  Patient has improved with pain levels and strength since in treatment  PT will continue to address the strength and endurance impairments in the jaw by performing shoulder and scapular strengthening, stretching, posture training, functional activities and manual techniques to allow the patient to return to her PLOF  PT recommended 1-2x/week for 2-3 weeks c a good prognosis 2* noted progress  Impairments: abnormal or restricted ROM, activity intolerance, impaired physical strength, lacks appropriate home exercise program, pain with function, poor posture  and poor body mechanics    Symptom irritability: lowUnderstanding of Dx/Px/POC: good   Prognosis: good    Goals  STG: In four weeks the patient will:    1  Be (I) with her HEP  (75% MET)  2  Increase shoulder and scapular strength to 4+/5 MMT score to assist c ADLs  (75% MET)  3  Increase jaw opening ROM by 5 degrees to assist c eating and flosssing  (in progress)  4  Performed proper posture for 75% of the session with 1-2 cues  (MET)      LTG: In six weeks, the patient will:    1  Increase FOTO score to 89 to demonstrate improvements in symptoms and function  (In progress)  2  Demonstrate full jaw opening without pain  (75% MET)  3  Perform 15 mins of chewing without pain  (MET)  4  Increase shoulder and scapular strength to 5/5 MMT score to assist c prolonged activities  (50% MET)  5  Performed proper posture for 100% of the session without cues   (90% MET)      Plan  Patient would benefit from: skilled physical therapy  Referral necessary: No  Planned modality interventions: cryotherapy and thermotherapy: hydrocollator packs  Planned therapy interventions: abdominal trunk stabilization, manual therapy, joint mobilization, massage, neuromuscular re-education, patient education, postural training, strengthening, stretching, therapeutic activities, therapeutic exercise, home exercise program, functional ROM exercises and body mechanics training  Frequency: 2x week  Duration in visits: 6  Duration in weeks: 3  Plan of Care beginning date: 2019  Plan of Care expiration date: 2020  Treatment plan discussed with: patient    Goals For Foot    STG: In four weeks the patient will:    1  Be (I) with her HEP  (75% MET)  2  Increase hip,knee and ankle strength to 4+/5 MMT score to assist c stairs and driving  (93% MET)  3  Increase L ankle ROM by 5 degrees to assist c jogging  (in progress)      LTG: In six weeks, the patient will:    1  Increase FOTO score to 80 to demonstrate improvements in symptoms and function  (MET)  2  Demonstrate full L ankle AROM without pain  (in progress)  3  Perform 2-3 miles of jogging without pain  (in progress)  4  Increase hip,knee and ankle strength to 5/5 MMT score to assist c prolonged jogging  (in progress)    Subjective Evaluation    History of Present Illness  Mechanism of injury: Patient noted that she is 75% back to her PLOF  Patient is now able to eat a burger and cherry tomato's comfortably  Patient noted her ear and L shoulder pain has improved since physical therapy  Patient continues to be limited in maximal opening and does get pain with baby carrots  Patient has improved with L foot ROM and pain levels since evaluation  Patient is able to amb in boots without pain which is an improvement  Patient noted at times she does have back pain due to gait deviations             Recurrent probem    Quality of life: good    Pain  Current pain ratin  At best pain ratin  At worst pain rating: 3  Location: L TMJ  Quality: sharp and dull ache  Relieving factors: medications  Aggravating factors: eating  Progression: improved    Social Support  Lives in: apartment  Lives with: young children (15 yo son)    Employment status: working ([employer])  Hand dominance: right  Exercise history: Exercise video, walking    Treatments  Treatments tried: Braces  Patient Goals  Patient goals for therapy: decreased pain, increased motion, increased strength, return to sport/leisure activities and independence with ADLs/IADLs  Patient goal: "to open without pain " (75% MET)        Objective     Postural Observations  Seated posture: fair  Standing posture: fair  Correction of posture: makes symptoms better    Additional Postural Observation Details  Improved posture decreases her jaw pain  PT educated the patient on a lumbar roll while seated  RE: Since IE the patient has improved with posture and requires less cues throughout the session       Neurological Testing     Sensation   Cervical/Thoracic   Left   Intact: light touch    Right   Intact: light touch    Active Range of Motion   Cervical/Thoracic Spine       Cervical    Flexion:  Restriction level: minimal  Extension:  Restriction level: minimal  Left lateral flexion:  Restriction level: minimal  Right lateral flexion:  Restriction level minimal  Left rotation:  Restriction level: minimal  Right rotation:  Restriction level: minimal    Strength/Myotome Testing     Left Shoulder     Planes of Motion   Flexion: 4+   Abduction: 4+   External rotation at 0°:  4+   Internal rotation at 0°:  4+     Right Shoulder     Planes of Motion   Flexion: 4+   Abduction: 4+   External rotation at 0°:  4+   Internal rotation at 0°:  4+     Left Elbow   Flexion: 5  Extension: 5    Right Elbow   Flexion: 5  Extension: 5    Left Wrist/Hand   Wrist extension: 5  Wrist flexion: 5    Right Wrist/Hand   Wrist extension: 5  Wrist flexion: 5    General Comments:      Shoulder Comments   WNL  TMJ   Jaw observations: facial symmetry within normal limits  good oral hygiene  Joint sounds left: popping and normal  Lateral bite test, right: pain on left and Less pain mendoza IE  ROM: limited and pain with movement  Opening (mm): 38   Lateral excursion, left (mm): 20  Lateral excursion, right (mm)t: 15 Foot [employer]      Observations     Additional Observation Details  Patient amb the following gait deviations: toe heel pattern, high arch   Patient presents with a callus on the ball of her L foot under the 2nd Metatarsal head        Neurological Testing      Sensation      Ankle/Foot   Left Ankle/Foot   Intact: light touch     Right Ankle/Foot   Intact: light touch      Active Range of Motion   Left Ankle/Foot   Dorsiflexion (ke): 3 degrees   Plantar flexion: 50 degrees   Inversion: 23 degrees   Eversion: 10 degrees      Right Ankle/Foot   Dorsiflexion (ke): 6 degrees   Plantar flexion: 50 degrees   Inversion: 26 degrees   Eversion: 10 degrees      Strength/Myotome Testing      Left Hip   Planes of Motion   Flexion: 4  Abduction: 4 +  Adduction: 4     Right Hip   Planes of Motion   Flexion: 4  Abduction: 4 +  Adduction: 4     Left Ankle/Foot   Dorsiflexion: 4 +  Plantar flexion: 4 +  Inversion: 4 +  Eversion: 4     Right Ankle/Foot   Dorsiflexion: 4+  Plantar flexion: 4 +  Inversion: 4+  Eversion: 4+    Additional Strength Details  Knee flexion: 4+/5  Knee extension: 4+/5     Tests   Left Ankle/Foot   Negative for navicular drop, syndesmosis squeeze and Tinel's sign (tarsal tunnel)              Precautions: none      Ankle    Manual  12/2 12/4 12/9 12/11 12/16 12/18       L ankle distraction and talocural post mob nv            Metatarsal ant/post mob nv            Metatarsal sweeping mob nv            L great toe distraction nv            Re-eval MW     MW           Exercise Diary  12/2 12/4 12/9 12/11 12/16 12/18                    Calf and soleus stretch nv 3x30" ea 3x30" ea 3x30" ea 3x30" ea        Heel raises nv 2x10  3x10 3x10        Great toe stretch (extension) nv            4 way ankle nv RTB  2x10 ea RTB  2x10 ea  (B)          FTEO/FTEC nv   3x30" ea on foam  EC 1 HHA         SLS nv            Tandem stance on foam    3x30" ea Modalities  12/2            HP/CP PRN np                                         TMJ    Manual  11/18 11/20 11/25 11/27 12/4 12/9 12/11 12/16 12/18    TMJ distraction and ant mob MW  grade II MW  Grade  II    MW  Grade  II MW  Grade  II MW  Grade  II Mw  Grade  II    TMJ STM (ptyergoids) MW MW MW  MW & masseter MW MW MW MW    Cervical upglide/downglide nv            Sub-occipital release nv MW  SK    MW     L shoulder post and inferior mobs   MW  Grade III SK Grade III         Re-eval         MW        Exercise Diary  11/18 11/20 11/25 11/27 12/4 12/9 12/11 12/16 12/18    UBE  nv 10' retro 10' retro 10' retro 10' retro 10'  retro 10' retro 10' retro 10' retro    pec stretch 5x15" 5x15"  5x15" 5x15"  5x15" On full foam roll  5x15"  5x15" 5x15"    Open books 5x15" ea 5x15" ea 5x15" ea 5x15" ea  5x15" ea        Scapular retraction nv 2x10  5" hold 2x10  5" hold 2x10  5" hold     2x10  5" hold    Chin tuck nv 2x10  5" hold 2x10  5" hold 2x10  5" hold 2x10  5" hold 2x10  5" hold 3x10  5" hold 3x10  5" hold 3x10  5" hold    Serratus punch nv nv 2x10 3x10 3x10 2x10  2# 2x10  2# 3x10  2# 3x10  2#    sidelying ER nv nv 2x10 ea 2x10 ea  2x10 ea  1# 2x10 ea  1# 2x10 ea  1# 2x10  Ea  1#    TA nv nv nv 10x 5" hold   2x10  5" hold x10  5" hold     TA+ hip add nv       x10  5" hold     TA + hip abd nv                                                                                                                                                  Modalities  11/18            HP/CP PRN np

## 2019-12-23 ENCOUNTER — APPOINTMENT (OUTPATIENT)
Dept: PHYSICAL THERAPY | Facility: CLINIC | Age: 39
End: 2019-12-23
Payer: COMMERCIAL

## 2019-12-30 ENCOUNTER — APPOINTMENT (OUTPATIENT)
Dept: PHYSICAL THERAPY | Facility: CLINIC | Age: 39
End: 2019-12-30
Payer: COMMERCIAL

## 2020-01-02 ENCOUNTER — OFFICE VISIT (OUTPATIENT)
Dept: PHYSICAL THERAPY | Facility: CLINIC | Age: 40
End: 2020-01-02
Payer: COMMERCIAL

## 2020-01-02 DIAGNOSIS — M26.609 TMJ DYSFUNCTION: ICD-10-CM

## 2020-01-02 DIAGNOSIS — G89.29 CHRONIC PAIN OF LEFT ANKLE: ICD-10-CM

## 2020-01-02 DIAGNOSIS — M25.572 CHRONIC PAIN OF LEFT ANKLE: ICD-10-CM

## 2020-01-02 DIAGNOSIS — R51.9 LEFT FACIAL PAIN: Primary | ICD-10-CM

## 2020-01-02 DIAGNOSIS — M25.572 ARTHRALGIA OF LEFT FOOT: ICD-10-CM

## 2020-01-02 PROCEDURE — 97140 MANUAL THERAPY 1/> REGIONS: CPT

## 2020-01-02 PROCEDURE — 97112 NEUROMUSCULAR REEDUCATION: CPT

## 2020-01-02 PROCEDURE — 97110 THERAPEUTIC EXERCISES: CPT

## 2020-01-02 NOTE — PROGRESS NOTES
Daily Note     Today's date: 2020  Patient name: Violeta Norris  : 1980  MRN: [id number]  Referring provider: LIA Coley  Dx:   Encounter Diagnosis     ICD-10-CM    1  Left facial pain R51    2  TMJ dysfunction M26 609    3  Chronic pain of left ankle M25 572     G89 29    4  Arthralgia of left foot M25 572        Start Time: 1600  Stop Time: 1700  Total time in clinic (min): 60 minutes    Subjective: Patient noted that her jaw has not bothered her and is able to eat pork chops without pain  Patient noted she was on a long car ride and did a lot of foot taping to the music and noted increased ankle pain  Objective: See treatment diary below      Assessment: Due to the very minimal jaw pain, the sessions will focus on her ankle  Patient performed ankle strengthening and stretching to assist c functional activities  PT performed forefoot and midfoot mobilizations to assist c walking and stairs  Patient and PT noted improvements post manuals  PT educated the patient on use of a forefoot spacer in the shoe to assist c pain levels  Patient would benefit from continued PT to allow the patient to return to her PLOF  Plan: Continue per plan of care        Precautions: none      Ankle    Manual   1/2      L ankle distraction and talocural post mob nv      MW  Grade  III      Metatarsal ant/post mob nv      MW  Grade  III      Metatarsal sweeping mob nv      MW  Grade  III      L great toe distraction nv      MW  Grade  III      Re-eval MW     MW           Exercise Diary   1/2      bike       Retro  10'      Calf and soleus stretch nv 3x30" ea 3x30" ea 3x30" ea 3x30" ea  3x30" ea      Heel raises nv 2x10  3x10 3x10  2x10      Great toe stretch (extension) nv      5x15"      4 way ankle nv RTB  2x10 ea RTB  2x10 ea  (B)    BTB  2x10 ea      FTEO/FTEC nv   3x30" ea on foam  EC 1 HHA         SLS nv            Tandem stance on foam    3x30" ea                                                                                                                                                                         Modalities  12/2            HP/CP PRN np                                         TMJ    Manual  11/18 11/20 11/25 11/27 12/4 12/9 12/11 12/16 12/18    TMJ distraction and ant mob MW  grade II MW  Grade  II    MW  Grade  II MW  Grade  II MW  Grade  II Mw  Grade  II    TMJ STM (ptyergoids) MW MW MW  MW & masseter MW MW MW MW    Cervical upglide/downglide nv            Sub-occipital release nv MW  SK    MW     L shoulder post and inferior mobs   MW  Grade III SK Grade III         Re-eval         MW        Exercise Diary  11/18 11/20 11/25 11/27 12/4 12/9 12/11 12/16 12/18    UBE  nv 10' retro 10' retro 10' retro 10' retro 10'  retro 10' retro 10' retro 10' retro    pec stretch 5x15" 5x15"  5x15" 5x15"  5x15" On full foam roll  5x15"  5x15" 5x15"    Open books 5x15" ea 5x15" ea 5x15" ea 5x15" ea  5x15" ea        Scapular retraction nv 2x10  5" hold 2x10  5" hold 2x10  5" hold     2x10  5" hold    Chin tuck nv 2x10  5" hold 2x10  5" hold 2x10  5" hold 2x10  5" hold 2x10  5" hold 3x10  5" hold 3x10  5" hold 3x10  5" hold    Serratus punch nv nv 2x10 3x10 3x10 2x10  2# 2x10  2# 3x10  2# 3x10  2#    sidelying ER nv nv 2x10 ea 2x10 ea  2x10 ea  1# 2x10 ea  1# 2x10 ea  1# 2x10  Ea  1#    TA nv nv nv 10x 5" hold   2x10  5" hold x10  5" hold     TA+ hip add nv       x10  5" hold     TA + hip abd nv                                                                                                                                                  Modalities  11/18            HP/CP PRN np

## 2020-01-06 ENCOUNTER — APPOINTMENT (OUTPATIENT)
Dept: PHYSICAL THERAPY | Facility: CLINIC | Age: 40
End: 2020-01-06
Payer: COMMERCIAL

## 2020-01-09 ENCOUNTER — OFFICE VISIT (OUTPATIENT)
Dept: PHYSICAL THERAPY | Facility: CLINIC | Age: 40
End: 2020-01-09
Payer: COMMERCIAL

## 2020-01-09 DIAGNOSIS — M26.609 TMJ DYSFUNCTION: ICD-10-CM

## 2020-01-09 DIAGNOSIS — R51.9 LEFT FACIAL PAIN: Primary | ICD-10-CM

## 2020-01-09 DIAGNOSIS — M25.572 ARTHRALGIA OF LEFT FOOT: ICD-10-CM

## 2020-01-09 DIAGNOSIS — G89.29 CHRONIC PAIN OF LEFT ANKLE: ICD-10-CM

## 2020-01-09 DIAGNOSIS — M25.572 CHRONIC PAIN OF LEFT ANKLE: ICD-10-CM

## 2020-01-09 PROCEDURE — 97112 NEUROMUSCULAR REEDUCATION: CPT

## 2020-01-09 PROCEDURE — 97140 MANUAL THERAPY 1/> REGIONS: CPT

## 2020-01-09 NOTE — PROGRESS NOTES
Daily Note     Today's date: 2020  Patient name: Antonio Sharp  : 1980  MRN: [id number]  Referring provider: LIA Murray  Dx:   Encounter Diagnosis     ICD-10-CM    1  Left facial pain R51    2  TMJ dysfunction M26 609    3  Chronic pain of left ankle M25 572     G89 29    4  Arthralgia of left foot M25 572        Start Time: 1700  Stop Time: 1755  Total time in clinic (min): 55 minutes    Subjective: Patient noted that her ankle has been feeling good over the past few days  Patient noted that she feels like she can open her jaw more and ate a cheese steak without pain which is an improvement  Patient noted her L shoulder is in pain  Objective: See treatment diary below      Assessment: PT measured the mandibular opening and measured 52 degrees which is an improvement from 38 degrees prior to her trip  Patient performed shoulder and neck strengthening exercises to assist c pain levels  PT performed mobilizations to the L shoulder to assist c pain  Patient noted improvements post manuals and exercises  Patient would benefit from continued PT to allow the patient to return to her PLOF  Plan: Continue per plan of care        Precautions: none      Ankle    Manual       L ankle distraction and talocural post mob nv      MW  Grade  III      Metatarsal ant/post mob nv      MW  Grade  III      Metatarsal sweeping mob nv      MW  Grade  III      L great toe distraction nv      MW  Grade  III      Re-eval MW     MW           Exercise Diary       bike       Retro  10'      Calf and soleus stretch nv 3x30" ea 3x30" ea 3x30" ea 3x30" ea  3x30" ea      Heel raises nv 2x10  3x10 3x10  2x10      Great toe stretch (extension) nv      5x15"      4 way ankle nv RTB  2x10 ea RTB  2x10 ea  (B)    BTB  2x10 ea      FTEO/FTEC nv   3x30" ea on foam  EC 1 HHA         SLS nv            Tandem stance on foam    3x30" ea Modalities  12/2            HP/CP PRN np                                         TMJ    Manual  11/18 11/20 11/25 11/27 12/4 12/9 12/11 12/16 12/18 1/9   TMJ distraction and ant mob MW  grade II MW  Grade  II    MW  Grade  II MW  Grade  II MW  Grade  II Mw  Grade  II    TMJ STM (ptyergoids) MW MW MW  MW & masseter MW MW MW MW    Cervical upglide/downglide nv            Sub-occipital release nv MW  SK    MW     L shoulder post and inferior mobs   MW  Grade III SK Grade III      MW  Grade  III   Re-eval         MW        Exercise Diary  11/18 11/20 11/25 11/27 12/4 12/9 12/11 12/16 12/18 1/9   UBE  nv 10' retro 10' retro 10' retro 10' retro 10'  retro 10' retro 10' retro 10' retro 10' retro   pec stretch 5x15" 5x15"  5x15" 5x15"  5x15" On full foam roll  5x15"  5x15" 5x15" 5x15"    Open books 5x15" ea 5x15" ea 5x15" ea 5x15" ea  5x15" ea     5x15"ea   Scapular retraction nv 2x10  5" hold 2x10  5" hold 2x10  5" hold     2x10  5" hold 3x10  5" hold   Chin tuck nv 2x10  5" hold 2x10  5" hold 2x10  5" hold 2x10  5" hold 2x10  5" hold 3x10  5" hold 3x10  5" hold 3x10  5" hold 3x10  5" hold   Serratus punch nv nv 2x10 3x10 3x10 2x10  2# 2x10  2# 3x10  2# 3x10  2# 2x10  3#   sidelying ER nv nv 2x10 ea 2x10 ea  2x10 ea  1# 2x10 ea  1# 2x10 ea  1# 2x10  Ea  1# 2x10 ea  1#   TA nv nv nv 10x 5" hold   2x10  5" hold x10  5" hold     TA+ hip add nv       x10  5" hold     TA + hip abd nv            Rows and extensions          Lime  2x10 ea   Skier stretch          5x15"                                                                                                               Modalities  11/18            HP/CP PRN np

## 2020-01-13 ENCOUNTER — APPOINTMENT (OUTPATIENT)
Dept: PHYSICAL THERAPY | Facility: CLINIC | Age: 40
End: 2020-01-13
Payer: COMMERCIAL

## 2020-01-13 ENCOUNTER — OFFICE VISIT (OUTPATIENT)
Dept: PHYSICAL THERAPY | Facility: CLINIC | Age: 40
End: 2020-01-13
Payer: COMMERCIAL

## 2020-01-13 DIAGNOSIS — M26.609 TMJ DYSFUNCTION: ICD-10-CM

## 2020-01-13 DIAGNOSIS — R51.9 LEFT FACIAL PAIN: Primary | ICD-10-CM

## 2020-01-13 DIAGNOSIS — M25.572 ARTHRALGIA OF LEFT FOOT: ICD-10-CM

## 2020-01-13 DIAGNOSIS — M25.572 CHRONIC PAIN OF LEFT ANKLE: ICD-10-CM

## 2020-01-13 DIAGNOSIS — G89.29 CHRONIC PAIN OF LEFT ANKLE: ICD-10-CM

## 2020-01-13 PROCEDURE — 97110 THERAPEUTIC EXERCISES: CPT | Performed by: PHYSICAL THERAPIST

## 2020-01-13 PROCEDURE — 97140 MANUAL THERAPY 1/> REGIONS: CPT | Performed by: PHYSICAL THERAPIST

## 2020-01-13 NOTE — PROGRESS NOTES
Daily Note     Today's date: 2020  Patient name: Gwyn Sky  : 1980  MRN: [id number]  Referring provider: LIA García  Dx:   Encounter Diagnosis     ICD-10-CM    1  Left facial pain R51    2  TMJ dysfunction M26 609    3  Chronic pain of left ankle M25 572     G89 29    4  Arthralgia of left foot M25 572                 Pt 1 on 1 from 400p to 430    Subjective: Reports her pain in bother her L foot and jaw have both improved  Says her jaw is not bother her today and would like to work a little more on her foot  Has not been consistent with HEP  Objective: See treatment diary below      Assessment: Tolerated session well  Demonstrates continued limited ankle DF b/l due to gastroc tightness  Occasional cues needed to correct technique with exercises  Reviewed HEP with pt and provided bands and education to emphasize compliance  Would benefit from continued PT  Plan: Continue per plan of care        Precautions: none      Ankle    Manual      L ankle distraction and talocural post mob nv      MW  Grade  III  DD grade IV    Metatarsal ant/post mob nv      MW  Grade  III      Metatarsal sweeping mob nv      MW  Grade  III      L great toe distraction nv      MW  Grade  III  DD grade IV    Re-eval MW     MW           Exercise Diary      bike       Retro  10'  Retro  10'    Calf and soleus stretch nv 3x30" ea 3x30" ea 3x30" ea 3x30" ea  3x30" ea  3x30" ea    Heel raises nv 2x10  3x10 3x10  2x10  3x10 on edge of step    Great toe stretch (extension) nv      5x15"  5x15"    4 way ankle nv RTB  2x10 ea RTB  2x10 ea  (B)    BTB  2x10 ea  BTB  2x10 ea    FTEO/FTEC nv   3x30" ea on foam  EC 1 HHA         SLS nv            Tandem stance on foam    3x30" ea Modalities  12/2            HP/CP PRN np                                               TMJ    Manual  1/13 12/4 12/9 12/11 12/16 12/18 1/9   TMJ distraction and ant mob      MW  Grade  II MW  Grade  II MW  Grade  II Mw  Grade  II    TMJ STM (ptyergoids)     MW & masseter MW MW MW MW    Cervical upglide/downglide             Sub-occipital release        MW     L shoulder post and inferior mobs          MW  Grade  III   Re-eval         MW        Exercise Diary  1/13 12/4 12/9 12/11 12/16 12/18 1/9   UBE     10' retro 10'  retro 10' retro 10' retro 10' retro 10' retro   pec stretch 5x15"     5x15" On full foam roll  5x15"  5x15" 5x15" 5x15"    Open books     5x15" ea     5x15"ea   Scapular retraction         2x10  5" hold 3x10  5" hold   Chin tuck 3x10  5" hold    2x10  5" hold 2x10  5" hold 3x10  5" hold 3x10  5" hold 3x10  5" hold 3x10  5" hold   Serratus punch     3x10 2x10  2# 2x10  2# 3x10  2# 3x10  2# 2x10  3#   sidelying ER      2x10 ea  1# 2x10 ea  1# 2x10 ea  1# 2x10  Ea  1# 2x10 ea  1#   TA       2x10  5" hold x10  5" hold     TA+ hip add        x10  5" hold     TA + hip abd             Rows and extensions Lime  2x10 ea         Lime  2x10 ea   Skier stretch          5x15"                                                                                                               Modalities  11/18            HP/CP PRN np

## 2020-01-15 ENCOUNTER — OFFICE VISIT (OUTPATIENT)
Dept: PHYSICAL THERAPY | Facility: CLINIC | Age: 40
End: 2020-01-15
Payer: COMMERCIAL

## 2020-01-15 DIAGNOSIS — R51.9 LEFT FACIAL PAIN: Primary | ICD-10-CM

## 2020-01-15 DIAGNOSIS — M25.572 CHRONIC PAIN OF LEFT ANKLE: ICD-10-CM

## 2020-01-15 DIAGNOSIS — M25.572 ARTHRALGIA OF LEFT FOOT: ICD-10-CM

## 2020-01-15 DIAGNOSIS — G89.29 CHRONIC PAIN OF LEFT ANKLE: ICD-10-CM

## 2020-01-15 DIAGNOSIS — M26.609 TMJ DYSFUNCTION: ICD-10-CM

## 2020-01-15 PROCEDURE — 97112 NEUROMUSCULAR REEDUCATION: CPT

## 2020-01-15 PROCEDURE — 97110 THERAPEUTIC EXERCISES: CPT

## 2020-01-15 NOTE — PROGRESS NOTES
Daily Note     Today's date: 1/15/2020  Patient name: Antonio Sharp  : 1980  MRN: [id number]  Referring provider: WAQAS Murray*  Dx:   Encounter Diagnosis     ICD-10-CM    1  Left facial pain R51    2  TMJ dysfunction M26 609    3  Chronic pain of left ankle M25 572     G89 29    4  Arthralgia of left foot M25 572        Start Time: 1600  Stop Time: 1645  Total time in clinic (min): 45 minutes    Subjective: Patient noted she was sore after last session and continues to note tightness in the gastroc  Patient noted that she continues to note improvement with mandibular opening and no pain  Patient noted that she has some hip/back pain after performing the pec stretch at home  Objective: See treatment diary below      Assessment: PT educated the patient on proper mechanics during pec stretch 2* hip and back pain at home  PT introduced hip strengthening exercises to assist c LE strength during jogging and functional activities  Patient required min VCs and noted muscle fatigue post exercise  Patient would benefit from continued PT to allow the patient to return to her PLOF  Plan: Continue per plan of care        Precautions: none      Ankle    Manual  12/2 12/4 12/9 12/11 12/16 12/18 1/2 1/9 1/13 1/15   L ankle distraction and talocural post mob nv      MW  Grade  III  DD grade IV    Metatarsal ant/post mob nv      MW  Grade  III      Metatarsal sweeping mob nv      MW  Grade  III      L great toe distraction nv      MW  Grade  III  DD grade IV    Re-eval MW     MW           Exercise Diary  12/2 12/4 12/9 12/11 12/16 12/18 1/2 1/9 1/13 1/15   bike       Retro  10'  Retro  10' Retro  10'   Calf and soleus stretch nv 3x30" ea 3x30" ea 3x30" ea 3x30" ea  3x30" ea  3x30" ea 3x30" ea   Heel raises nv 2x10  3x10 3x10  2x10  3x10 on edge of step    Great toe stretch (extension) nv      5x15"  5x15"    4 way ankle nv RTB  2x10 ea RTB  2x10 ea  (B)    BTB  2x10 ea  BTB  2x10 ea    FTEO/FTEC nv 3x30" ea on foam  EC 1 HHA         SLS nv            Tandem stance on foam    3x30" ea         bridge          2x10  3" hold   clams          2x10 ea   sidelying hip abd          2x10 ea   Hip add          2x10  5" hold                                                                                                               Modalities  12/2            HP/CP PRN np                                               TMJ    Manual  1/13 1/15   12/4 12/9 12/11 12/16 12/18 1/9   TMJ distraction and ant mob      MW  Grade  II MW  Grade  II MW  Grade  II Mw  Grade  II    TMJ STM (ptyergoids)     MW & masseter MW MW MW MW    Cervical upglide/downglide             Sub-occipital release        MW     L shoulder post and inferior mobs          MW  Grade  III   Re-eval         MW        Exercise Diary  1/13 1/15   12/4 12/9 12/11 12/16 12/18 1/9   UBE     10' retro 10'  retro 10' retro 10' retro 10' retro 10' retro   pec stretch 5x15" 5x15"    5x15" On full foam roll  5x15"  5x15" 5x15" 5x15"    Open books     5x15" ea     5x15"ea   Scapular retraction         2x10  5" hold 3x10  5" hold   Chin tuck 3x10  5" hold    2x10  5" hold 2x10  5" hold 3x10  5" hold 3x10  5" hold 3x10  5" hold 3x10  5" hold   Serratus punch     3x10 2x10  2# 2x10  2# 3x10  2# 3x10  2# 2x10  3#   sidelying ER      2x10 ea  1# 2x10 ea  1# 2x10 ea  1# 2x10  Ea  1# 2x10 ea  1#   TA       2x10  5" hold x10  5" hold     TA+ hip add        x10  5" hold     TA + hip abd             Rows and extensions Lime  2x10 ea         Lime  2x10 ea   Skier stretch          5x15"                                                                                                               Modalities  11/18            HP/CP PRN np

## 2020-01-20 ENCOUNTER — OFFICE VISIT (OUTPATIENT)
Dept: PHYSICAL THERAPY | Facility: CLINIC | Age: 40
End: 2020-01-20
Payer: COMMERCIAL

## 2020-01-20 DIAGNOSIS — R51.9 LEFT FACIAL PAIN: Primary | ICD-10-CM

## 2020-01-20 DIAGNOSIS — G89.29 CHRONIC PAIN OF LEFT ANKLE: ICD-10-CM

## 2020-01-20 DIAGNOSIS — M25.572 CHRONIC PAIN OF LEFT ANKLE: ICD-10-CM

## 2020-01-20 DIAGNOSIS — M26.609 TMJ DYSFUNCTION: ICD-10-CM

## 2020-01-20 DIAGNOSIS — M25.572 ARTHRALGIA OF LEFT FOOT: ICD-10-CM

## 2020-01-20 PROCEDURE — 97110 THERAPEUTIC EXERCISES: CPT

## 2020-01-20 PROCEDURE — 97112 NEUROMUSCULAR REEDUCATION: CPT

## 2020-01-20 NOTE — PROGRESS NOTES
Daily Note     Today's date: 2020  Patient name: Jennifer Mondragon  : 1980  MRN: [id number]  Referring provider: Gerry Gowers, D*  Dx:   Encounter Diagnosis     ICD-10-CM    1  Left facial pain R51    2  TMJ dysfunction M26 609    3  Chronic pain of left ankle M25 572     G89 29    4  Arthralgia of left foot M25 572        Start Time: 1630  Stop Time: 1710  Total time in clinic (min): 40 minutes    Subjective: Patient noted that she was able to eat a baby carrot today without pain  Patient noted her ankle feels pretty good  Patient noted that she gave blood today  Objective: See treatment diary below      Assessment: Exercises were limited 2* giving blood today  Patient required cues for hip strengthening exercises  Patient noted no pain throughout the session  Patient would benefit from continued PT to allow the patient to return to her PLOF  Plan: Continue per plan of care        Precautions: none      Ankle    Manual  1/20 12/4 12/9 12/11 12/16 12/18 1/2 1/9 1/13 1/15   L ankle distraction and talocural post mob       MW  Grade  III  DD grade IV    Metatarsal ant/post mob       MW  Grade  III      Metatarsal sweeping mob       MW  Grade  III      L great toe distraction       MW  Grade  III  DD grade IV    Re-eval      MW           Exercise Diary  1/20 12/4 12/9 12/11 12/16 12/18 1/2 1/9 1/13 1/15   bike X-ride  10'      Retro  10'  Retro  10' Retro  10'   Calf and soleus stretch  3x30" ea 3x30" ea 3x30" ea 3x30" ea  3x30" ea  3x30" ea 3x30" ea   Heel raises  2x10  3x10 3x10  2x10  3x10 on edge of step    Great toe stretch (extension)       5x15"  5x15"    4 way ankle  RTB  2x10 ea RTB  2x10 ea  (B)    BTB  2x10 ea  BTB  2x10 ea    FTEO/FTEC    3x30" ea on foam  EC 1 HHA         SLS             Tandem stance on foam    3x30" ea         bridge 2x10  5" hold         2x10  3" hold   clams 2x10 ea         2x10 ea   sidelying hip abd 2x10 ea         2x10 ea   Hip add 2x10  5" hold 2x10  5" hold                                                                                                               Modalities  12/2            HP/CP PRN np                                               TMJ    Manual  1/13 1/15 1/20  12/4 12/9 12/11 12/16 12/18 1/9   TMJ distraction and ant mob      MW  Grade  II MW  Grade  II MW  Grade  II Mw  Grade  II    TMJ STM (ptyergoids)     MW & masseter MW MW MW MW    Cervical upglide/downglide             Sub-occipital release        MW     L shoulder post and inferior mobs          MW  Grade  III   Re-eval         MW        Exercise Diary  1/13 1/15 1/20  12/4 12/9 12/11 12/16 12/18 1/9   UBE     10' retro 10'  retro 10' retro 10' retro 10' retro 10' retro   pec stretch 5x15" 5x15"    5x15" On full foam roll  5x15"  5x15" 5x15" 5x15"    Open books     5x15" ea     5x15"ea   Scapular retraction         2x10  5" hold 3x10  5" hold   Chin tuck 3x10  5" hold    2x10  5" hold 2x10  5" hold 3x10  5" hold 3x10  5" hold 3x10  5" hold 3x10  5" hold   Serratus punch     3x10 2x10  2# 2x10  2# 3x10  2# 3x10  2# 2x10  3#   sidelying ER      2x10 ea  1# 2x10 ea  1# 2x10 ea  1# 2x10  Ea  1# 2x10 ea  1#   TA       2x10  5" hold x10  5" hold     TA+ hip add        x10  5" hold     TA + hip abd             Rows and extensions Lime  2x10 ea         Lime  2x10 ea   Skier stretch          5x15"                                                                                                               Modalities  11/18            HP/CP PRN np

## 2020-01-23 ENCOUNTER — APPOINTMENT (OUTPATIENT)
Dept: PHYSICAL THERAPY | Facility: CLINIC | Age: 40
End: 2020-01-23
Payer: COMMERCIAL

## 2020-01-30 ENCOUNTER — OFFICE VISIT (OUTPATIENT)
Dept: PHYSICAL THERAPY | Facility: CLINIC | Age: 40
End: 2020-01-30
Payer: COMMERCIAL

## 2020-01-30 DIAGNOSIS — R51.9 LEFT FACIAL PAIN: Primary | ICD-10-CM

## 2020-01-30 DIAGNOSIS — G89.29 CHRONIC PAIN OF LEFT ANKLE: ICD-10-CM

## 2020-01-30 DIAGNOSIS — M26.609 TMJ DYSFUNCTION: ICD-10-CM

## 2020-01-30 DIAGNOSIS — M25.572 ARTHRALGIA OF LEFT FOOT: ICD-10-CM

## 2020-01-30 DIAGNOSIS — M25.572 CHRONIC PAIN OF LEFT ANKLE: ICD-10-CM

## 2020-01-30 PROCEDURE — 97140 MANUAL THERAPY 1/> REGIONS: CPT

## 2020-01-30 NOTE — LETTER
2020    [facility]  [address]    Patient: Stana Kussmaul   YOB: 1980   Date of Visit: 2020     Encounter Diagnosis     ICD-10-CM    1  Left facial pain R51    2  TMJ dysfunction M26 609    3  Chronic pain of left ankle M25 572     G89 29    4  Arthralgia of left foot M25 572        Dear Dr Jayesh Bañuelos:    Thank you for your recent referral of Stana Kussmaul  Please review the attached evaluation summary from Porsche's recent visit  Please verify that you agree with the plan of care by signing the attached order  If you have any questions or concerns, please do not hesitate to call  I sincerely appreciate the opportunity to share in the care of one of your patients and hope to have another opportunity to work with you in the near future  Sincerely,    Jammie Lance, PT      Referring Provider:      I certify that I have read the below Plan of Care and certify the need for these services furnished under this plan of treatment while under my care  [facility]  [address]  [address] [place] [address] Avenue: [phone]          PT Discharge    Today's date: 2020  Patient name: Stana Kussmaul  : 1980  MRN: [id number]  Referring provider: LIA Munroe  Dx:   Encounter Diagnosis     ICD-10-CM    1  Left facial pain R51    2  TMJ dysfunction M26 609    3  Chronic pain of left ankle M25 572     G89 29    4  Arthralgia of left foot M25 572        Start Time: 1700  Stop Time: 1730  Total time in clinic (min): 30 minutes    Assessment  Assessment details: Patient continues to improve with strength in the LEs and UEs as well as decreased pain levels in the TMJ and ankle  Patient is (I) with her HEP and her ADLs  Due to noted improvements, the patient will be D/C from PT with a HEP  PT and patient agree with POC                 Understanding of Dx/Px/POC: good   Prognosis: good    Goals  Goals for the TMJ  STG: In four weeks the patient will:    1  Be (I) with her HEP  ( MET)  2  Increase shoulder and scapular strength to 4+/5 MMT score to assist c ADLs  (MET)  3  Increase jaw opening ROM by 5 degrees to assist c eating and flosssing  (MET)  4  Performed proper posture for 75% of the session with 1-2 cues  (MET)      LTG: In six weeks, the patient will:    1  Increase FOTO score to 89 to demonstrate improvements in symptoms and function  (MET)  2  Demonstrate full jaw opening without pain  ( MET)  3  Perform 15 mins of chewing without pain  (MET)  4  Increase shoulder and scapular strength to 5/5 MMT score to assist c prolonged activities  (50% MET)  5  Performed proper posture for 100% of the session without cues  ( MET)      Goals for the foot:  STG: In four weeks the patient will:    1  Be (I) with her HEP  (MET)  2  Increase hip,knee and ankle strength to 4+/5 MMT score to assist c stairs and driving  (MET)  3  Increase L ankle ROM by 5 degrees to assist c jogging  (MET)      LTG: In six weeks, the patient will:    1  Increase FOTO score to 80 to demonstrate improvements in symptoms and function  (MET)  2  Demonstrate full L ankle AROM without pain  (MET)  3  Perform 2-3 miles of jogging without pain  (90% MET)  4  Increase hip,knee and ankle strength to 5/5 MMT score to assist c prolonged jogging  (50% MET)        Plan  Duration in visits: 5  Duration in weeks: 4  Plan of Care beginning date: 2020  Plan of Care expiration date: 2020  Treatment plan discussed with: patient        Subjective Evaluation    History of Present Illness  Mechanism of injury: Patient feels that she is 90% back to her PLOF for her TMJ and her foot pain  Patient is (I) with her ADLs and is now able to chew baby carrots without pain  Patient is (I) with her HEP             Recurrent probem    Quality of life: good    Pain  No pain reported  Current pain ratin  At best pain ratin  At worst pain ratin  Location: L TMJ  Quality: sharp and dull ache  Relieving factors: medications  Aggravating factors: eating  Progression: improved    Social Support  Lives in: apartment  Lives with: young children (15 yo son)    Employment status: working ([employer])  Hand dominance: right  Exercise history: Exercise video, walking    Treatments  Treatments tried: Braces  Patient Goals  Patient goals for therapy: decreased pain, increased motion, increased strength, return to sport/leisure activities and independence with ADLs/IADLs  Patient goal: "to open without pain " ( MET)        Objective     Postural Observations  Seated posture: good  Standing posture: good  Correction of posture: makes symptoms better    Additional Postural Observation Details  Improved posture decreases her jaw pain  PT educated the patient on a lumbar roll while seated  RE: Since IE the patient has improved with posture and requires less cues throughout the session       Neurological Testing     Sensation   Cervical/Thoracic   Left   Intact: light touch    Right   Intact: light touch    Active Range of Motion   Cervical/Thoracic Spine       Cervical    Flexion:  Restriction level: minimal  Extension:  Restriction level: minimal  Left lateral flexion:  Restriction level: minimal  Right lateral flexion:  Restriction level minimal  Left rotation:  Restriction level: minimal  Right rotation:  Restriction level: minimal    Strength/Myotome Testing     Left Shoulder     Planes of Motion   Flexion: 4+   Abduction: 4+   External rotation at 0°:  4+   Internal rotation at 0°:  4+     Right Shoulder     Planes of Motion   Flexion: 4+   Abduction: 4+   External rotation at 0°:  4+   Internal rotation at 0°:  4+     Left Elbow   Flexion: 5  Extension: 5    Right Elbow   Flexion: 5  Extension: 5    Left Wrist/Hand   Wrist extension: 5  Wrist flexion: 5    Right Wrist/Hand   Wrist extension: 5  Wrist flexion: 5    General Comments:      Shoulder Comments   WNL  TMJ   Jaw observations: facial symmetry within normal limits  good oral hygiene  Joint sounds left: normal  Lateral bite test, right: no pain  ROM: normal  Opening (mm): 52   Lateral excursion, left (mm): 20  Lateral excursion, right (mm)t: 20     Foot Objective Measurments      Observations     Additional Observation Details  Patient amb the following gait deviations: toe heel pattern, high arch   Patient presents with a callus on the ball of her L foot under the 2nd Metatarsal head        Neurological Testing      Sensation      Ankle/Foot   Left Ankle/Foot   Intact: light touch     Right Ankle/Foot   Intact: light touch      Active Range of Motion   Left Ankle/Foot   Dorsiflexion (ke):  5 degrees   Plantar flexion: 50 degrees   Inversion: 23 degrees   Eversion: 10 degrees      Right Ankle/Foot   Dorsiflexion (ke): 6 degrees   Plantar flexion: 50 degrees   Inversion: 26 degrees   Eversion: 10 degrees      Strength/Myotome Testing      Left Hip   Planes of Motion   Flexion: 4  Abduction: 4+  Adduction: 4 +     Right Hip   Planes of Motion   Flexion: 4  Abduction: 4+  Adduction: 4 +     Left Ankle/Foot   Dorsiflexion: 4+  Plantar flexion: 4+  Inversion: 4+  Eversion: 4 +     Right Ankle/Foot   Dorsiflexion: 4+  Plantar flexion: 4+  Inversion: 4+  Eversion: 4+    Additional Strength Details  Knee flexion: 4+/5  Knee extension: 4+/5     Tests   Left Ankle/Foot   Negative for navicular drop, syndesmosis squeeze and Tinel's sign (tarsal tunnel)              Precautions: none      Ankle    Manual  1/20 1/30 12/9 12/11 12/16 12/18 1/2 1/9 1/13 1/15   L ankle distraction and talocural post mob       MW  Grade  III  DD grade IV    Metatarsal ant/post mob       MW  Grade  III      Metatarsal sweeping mob       MW  Grade  III      L great toe distraction       MW  Grade  III  DD grade IV    Re-eval  MW    MW           Exercise Diary  1/20 1/30 12/9 12/11 12/16 12/18 1/2 1/9 1/13 1/15   bike X-ride  10' Retro  10'  Retro  10' Retro  10'   Calf and soleus stretch   3x30" ea 3x30" ea 3x30" ea  3x30" ea  3x30" ea 3x30" ea   Heel raises    3x10 3x10  2x10  3x10 on edge of step    Great toe stretch (extension)       5x15"  5x15"    4 way ankle   RTB  2x10 ea  (B)    BTB  2x10 ea  BTB  2x10 ea    FTEO/FTEC    3x30" ea on foam  EC 1 HHA         SLS             Tandem stance on foam    3x30" ea         bridge 2x10  5" hold         2x10  3" hold   clams 2x10 ea         2x10 ea   sidelying hip abd 2x10 ea         2x10 ea   Hip add 2x10  5" hold         2x10  5" hold                                                                                                               Modalities  12/2            HP/CP PRN np                                               TMJ    Manual  1/13 1/15 1/20 1/30 12/4 12/9 12/11 12/16 12/18 1/9   TMJ distraction and ant mob      MW  Grade  II MW  Grade  II MW  Grade  II Mw  Grade  II    TMJ STM (ptyergoids)     MW & masseter MW MW MW MW    Cervical upglide/downglide             Sub-occipital release    MW    MW     L shoulder post and inferior mobs    MW  Grade  III      MW  Grade  III   Re-eval    [facility]        Exercise Diary  1/13 1/15 1/20 1/30 12/4 12/9 12/11 12/16 12/18 1/9   UBE    10'  retro 10' retro 10'  retro 10' retro 10' retro 10' retro 10' retro   pec stretch 5x15" 5x15"    5x15" On full foam roll  5x15"  5x15" 5x15" 5x15"    Open books    5x15" ea 5x15" ea     5x15"ea   Scapular retraction         2x10  5" hold 3x10  5" hold   Chin tuck 3x10  5" hold   2x10 2x10  5" hold 2x10  5" hold 3x10  5" hold 3x10  5" hold 3x10  5" hold 3x10  5" hold   Serratus punch    3x10 3x10 2x10  2# 2x10  2# 3x10  2# 3x10  2# 2x10  3#   sidelying ER    2x10  1#  2x10 ea  1# 2x10 ea  1# 2x10 ea  1# 2x10  Ea  1# 2x10 ea  1#   TA       2x10  5" hold x10  5" hold     TA+ hip add        x10  5" hold     TA + hip abd             Rows and extensions Lime  2x10 ea   Lime  2x10ea      Lime  2x10 ea   Skier stretch          5x15"                                                                                                               Modalities  11/18            HP/CP PRN np

## 2020-01-30 NOTE — PROGRESS NOTES
PT Discharge    Today's date: 2020  Patient name: Nazanin Du  : 1980  MRN: [id number]  Referring provider: LIA Cardozo  Dx:   Encounter Diagnosis     ICD-10-CM    1  Left facial pain R51    2  TMJ dysfunction M26 609    3  Chronic pain of left ankle M25 572     G89 29    4  Arthralgia of left foot M25 572        Start Time: 1700  Stop Time: 1730  Total time in clinic (min): 30 minutes    Assessment  Assessment details: Patient continues to improve with strength in the LEs and UEs as well as decreased pain levels in the TMJ and ankle  Patient is (I) with her HEP and her ADLs  Due to noted improvements, the patient will be D/C from PT with a HEP  PT and patient agree with POC  Understanding of Dx/Px/POC: good   Prognosis: good    Goals  Goals for the TMJ  STG: In four weeks the patient will:    1  Be (I) with her HEP  ( MET)  2  Increase shoulder and scapular strength to 4+/5 MMT score to assist c ADLs  (MET)  3  Increase jaw opening ROM by 5 degrees to assist c eating and flosssing  (MET)  4  Performed proper posture for 75% of the session with 1-2 cues  (MET)      LTG: In six weeks, the patient will:    1  Increase FOTO score to 89 to demonstrate improvements in symptoms and function  (MET)  2  Demonstrate full jaw opening without pain  ( MET)  3  Perform 15 mins of chewing without pain  (MET)  4  Increase shoulder and scapular strength to 5/5 MMT score to assist c prolonged activities  (50% MET)  5  Performed proper posture for 100% of the session without cues  ( MET)      Goals for the foot:  STG: In four weeks the patient will:    1  Be (I) with her HEP  (MET)  2  Increase hip,knee and ankle strength to 4+/5 MMT score to assist c stairs and driving  (MET)  3  Increase L ankle ROM by 5 degrees to assist c jogging  (MET)      LTG: In six weeks, the patient will:    1  Increase FOTO score to 80 to demonstrate improvements in symptoms and function  (MET)  2   Demonstrate full L ankle AROM without pain  (MET)  3  Perform 2-3 miles of jogging without pain  (90% MET)  4  Increase hip,knee and ankle strength to 5/5 MMT score to assist c prolonged jogging  (50% MET)        Plan  Duration in visits: 5  Duration in weeks: 4  Plan of Care beginning date: 2020  Plan of Care expiration date: 2020  Treatment plan discussed with: patient        Subjective Evaluation    History of Present Illness  Mechanism of injury: Patient feels that she is 90% back to her PLOF for her TMJ and her foot pain  Patient is (I) with her ADLs and is now able to chew baby carrots without pain  Patient is (I) with her HEP  Recurrent probem    Quality of life: good    Pain  No pain reported  Current pain ratin  At best pain ratin  At worst pain ratin  Location: L TMJ  Quality: sharp and dull ache  Relieving factors: medications  Aggravating factors: eating  Progression: improved    Social Support  Lives in: apartment  Lives with: young children (15 yo son)    Employment status: working ([employer])  Hand dominance: right  Exercise history: Exercise video, walking    Treatments  Treatments tried: Braces  Patient Goals  Patient goals for therapy: decreased pain, increased motion, increased strength, return to sport/leisure activities and independence with ADLs/IADLs  Patient goal: "to open without pain " ( MET)        Objective     Postural Observations  Seated posture: good  Standing posture: good  Correction of posture: makes symptoms better    Additional Postural Observation Details  Improved posture decreases her jaw pain  PT educated the patient on a lumbar roll while seated  RE: Since IE the patient has improved with posture and requires less cues throughout the session       Neurological Testing     Sensation   Cervical/Thoracic   Left   Intact: light touch    Right   Intact: light touch    Active Range of Motion   Cervical/Thoracic Spine       Cervical    Flexion:  Restriction level: minimal  Extension:  Restriction level: minimal  Left lateral flexion:  Restriction level: minimal  Right lateral flexion:  Restriction level minimal  Left rotation:  Restriction level: minimal  Right rotation:  Restriction level: minimal    Strength/Myotome Testing     Left Shoulder     Planes of Motion   Flexion: 4+   Abduction: 4+   External rotation at 0°: 4+   Internal rotation at 0°: 4+     Right Shoulder     Planes of Motion   Flexion: 4+   Abduction: 4+   External rotation at 0°: 4+   Internal rotation at 0°: 4+     Left Elbow   Flexion: 5  Extension: 5    Right Elbow   Flexion: 5  Extension: 5    Left Wrist/Hand   Wrist extension: 5  Wrist flexion: 5    Right Wrist/Hand   Wrist extension: 5  Wrist flexion: 5    General Comments:      Shoulder Comments   WNL  TMJ   Jaw observations: facial symmetry within normal limits  good oral hygiene  Joint sounds left: normal  Lateral bite test, right: no pain  ROM: normal  Opening (mm): 52   Lateral excursion, left (mm): 20  Lateral excursion, right (mm)t: 20     Foot Objective Measurments      Observations     Additional Observation Details  Patient amb the following gait deviations: toe heel pattern, high arch   Patient presents with a callus on the ball of her L foot under the 2nd Metatarsal head        Neurological Testing      Sensation      Ankle/Foot   Left Ankle/Foot   Intact: light touch     Right Ankle/Foot   Intact: light touch      Active Range of Motion   Left Ankle/Foot   Dorsiflexion (ke): 5 degrees   Plantar flexion: 50 degrees   Inversion: 23 degrees   Eversion: 10 degrees      Right Ankle/Foot   Dorsiflexion (ke): 6 degrees   Plantar flexion: 50 degrees   Inversion: 26 degrees   Eversion: 10 degrees      Strength/Myotome Testing      Left Hip   Planes of Motion   Flexion: 4  Abduction: 4+  Adduction: 4+     Right Hip   Planes of Motion   Flexion: 4  Abduction: 4+  Adduction: 4+     Left Ankle/Foot   Dorsiflexion: 4+  Plantar flexion: 4+  Inversion: 4+  Eversion: 4+     Right Ankle/Foot   Dorsiflexion: 4+  Plantar flexion: 4+  Inversion: 4+  Eversion: 4+    Additional Strength Details  Knee flexion: 4+/5  Knee extension: 4+/5     Tests   Left Ankle/Foot   Negative for navicular drop, syndesmosis squeeze and Tinel's sign (tarsal tunnel)              Precautions: none      Ankle    Manual  1/20 1/30 12/9 12/11 12/16 12/18 1/2 1/9 1/13 1/15   L ankle distraction and talocural post mob       MW  Grade  III  DD grade IV    Metatarsal ant/post mob       MW  Grade  III      Metatarsal sweeping mob       MW  Grade  III      L great toe distraction       MW  Grade  III  DD grade IV    Re-eval  MW    MW           Exercise Diary  1/20 1/30 12/9 12/11 12/16 12/18 1/2 1/9 1/13 1/15   bike X-ride  10'      Retro  10'  Retro  10' Retro  10'   Calf and soleus stretch   3x30" ea 3x30" ea 3x30" ea  3x30" ea  3x30" ea 3x30" ea   Heel raises    3x10 3x10  2x10  3x10 on edge of step    Great toe stretch (extension)       5x15"  5x15"    4 way ankle   RTB  2x10 ea  (B)    BTB  2x10 ea  BTB  2x10 ea    FTEO/FTEC    3x30" ea on foam  EC 1 HHA         SLS             Tandem stance on foam    3x30" ea         bridge 2x10  5" hold         2x10  3" hold   clams 2x10 ea         2x10 ea   sidelying hip abd 2x10 ea         2x10 ea   Hip add 2x10  5" hold         2x10  5" hold                                                                                                               Modalities  12/2            HP/CP PRN np                                               TMJ    Manual  1/13 1/15 1/20 1/30 12/4 12/9 12/11 12/16 12/18 1/9   TMJ distraction and ant mob      MW  Grade  II MW  Grade  II MW  Grade  II Mw  Grade  II    TMJ STM (ptyergoids)     MW & masseter MW MW MW MW    Cervical upglide/downglide             Sub-occipital release    MW    MW     L shoulder post and inferior mobs    MW  Grade  III      MW  Grade  III   Re-eval    MW     MW        Exercise Diary  1/13 1/15 1/20 1/30 12/4 12/9 12/11 12/16 12/18 1/9   UBE    10'  retro 10' retro 10'  retro 10' retro 10' retro 10' retro 10' retro   pec stretch 5x15" 5x15"    5x15" On full foam roll  5x15"  5x15" 5x15" 5x15"    Open books    5x15" ea 5x15" ea     5x15"ea   Scapular retraction         2x10  5" hold 3x10  5" hold   Chin tuck 3x10  5" hold   2x10 2x10  5" hold 2x10  5" hold 3x10  5" hold 3x10  5" hold 3x10  5" hold 3x10  5" hold   Serratus punch    3x10 3x10 2x10  2# 2x10  2# 3x10  2# 3x10  2# 2x10  3#   sidelying ER    2x10  1#  2x10 ea  1# 2x10 ea  1# 2x10 ea  1# 2x10  Ea  1# 2x10 ea  1#   TA       2x10  5" hold x10  5" hold     TA+ hip add        x10  5" hold     TA + hip abd             Rows and extensions Lime  2x10 ea   Lime  2x10ea      Lime  2x10 ea   Skier stretch          5x15"                                                                                                               Modalities  11/18            HP/CP PRN np

## 2020-01-31 ENCOUNTER — TRANSCRIBE ORDERS (OUTPATIENT)
Dept: PHYSICAL THERAPY | Facility: CLINIC | Age: 40
End: 2020-01-31

## 2020-01-31 DIAGNOSIS — R51.9 LEFT FACIAL PAIN: Primary | ICD-10-CM

## 2020-01-31 DIAGNOSIS — M25.572 CHRONIC PAIN OF LEFT ANKLE: ICD-10-CM

## 2020-01-31 DIAGNOSIS — M25.572 ARTHRALGIA OF LEFT FOOT: ICD-10-CM

## 2020-01-31 DIAGNOSIS — M26.609 TMJ DYSFUNCTION: ICD-10-CM

## 2020-01-31 DIAGNOSIS — G89.29 CHRONIC PAIN OF LEFT ANKLE: ICD-10-CM

## 2020-06-25 ENCOUNTER — OFFICE VISIT (OUTPATIENT)
Dept: PHYSICAL THERAPY | Facility: CLINIC | Age: 40
End: 2020-06-25
Payer: COMMERCIAL

## 2020-06-25 DIAGNOSIS — S46.012A STRAIN OF LEFT ROTATOR CUFF CAPSULE, INITIAL ENCOUNTER: ICD-10-CM

## 2020-06-25 DIAGNOSIS — M75.42 IMPINGEMENT SYNDROME OF LEFT SHOULDER: Primary | ICD-10-CM

## 2020-06-25 PROCEDURE — 97140 MANUAL THERAPY 1/> REGIONS: CPT

## 2020-06-25 PROCEDURE — 97161 PT EVAL LOW COMPLEX 20 MIN: CPT

## 2020-06-25 PROCEDURE — 97110 THERAPEUTIC EXERCISES: CPT

## 2020-06-29 ENCOUNTER — OFFICE VISIT (OUTPATIENT)
Dept: PHYSICAL THERAPY | Facility: CLINIC | Age: 40
End: 2020-06-29
Payer: COMMERCIAL

## 2020-06-29 ENCOUNTER — TRANSCRIBE ORDERS (OUTPATIENT)
Dept: PHYSICAL THERAPY | Facility: CLINIC | Age: 40
End: 2020-06-29

## 2020-06-29 DIAGNOSIS — M75.42 IMPINGEMENT SYNDROME OF LEFT SHOULDER: Primary | ICD-10-CM

## 2020-06-29 DIAGNOSIS — S46.012A STRAIN OF LEFT ROTATOR CUFF CAPSULE, INITIAL ENCOUNTER: ICD-10-CM

## 2020-06-29 PROCEDURE — 97140 MANUAL THERAPY 1/> REGIONS: CPT

## 2020-06-29 PROCEDURE — 97112 NEUROMUSCULAR REEDUCATION: CPT

## 2020-07-01 ENCOUNTER — OFFICE VISIT (OUTPATIENT)
Dept: PHYSICAL THERAPY | Facility: CLINIC | Age: 40
End: 2020-07-01
Payer: COMMERCIAL

## 2020-07-01 DIAGNOSIS — S46.012A STRAIN OF LEFT ROTATOR CUFF CAPSULE, INITIAL ENCOUNTER: ICD-10-CM

## 2020-07-01 DIAGNOSIS — M75.42 IMPINGEMENT SYNDROME OF LEFT SHOULDER: Primary | ICD-10-CM

## 2020-07-01 PROCEDURE — 97112 NEUROMUSCULAR REEDUCATION: CPT

## 2020-07-01 PROCEDURE — 97110 THERAPEUTIC EXERCISES: CPT

## 2020-07-01 PROCEDURE — 97140 MANUAL THERAPY 1/> REGIONS: CPT

## 2020-07-01 NOTE — PROGRESS NOTES
Daily Note     Today's date: 2020  Patient name: Meghan Prieto  : 1980  MRN: [id number]  Referring provider: Isabella Miguel, PT  Dx:   Encounter Diagnosis     ICD-10-CM    1  Impingement syndrome of left shoulder M75 42    2  Strain of left rotator cuff capsule, initial encounter S46 012A        Start Time: 1645  Stop Time: 1730  Total time in clinic (min): 45 minutes    Subjective: Patient noted her shoulder feels better  Patient noted the tape has helped her  Objective: See treatment diary below      Assessment: PT applied leuko tape to assist c pain  PT introduced rows, extensions and prone Ts  Patient required mod cues throughout the session for form and posture  Patient noted improvements at the end of the session after manuals  Patient would benefit from continued PT to allow the patient to return to her PLOF  Plan: Continue per plan of care        Precautions: none      Manuals           L shoulder post and inferior mob nv MW  Grade  III MW  Grade  III          Pec STM nv  MW          Leuko tape L shoulder for posture MW MW MW                       Neuro Re-Ed             Scapular retraction 2x10  5" hold 2x10  5" hold HEP          Serratus punch nv 2x10 ea 3x10 ea          sidelying ER nv 2x10 ea 2x10 ea  1#          Prone I and T nv (I)  2x10 2x10 ea          Rows and extensions   Lime  2x10 ea                                    Ther Ex             UBE retro nv 10' 10'          pec stretch nv standing  5x15" standing  5x15" ea          Open books 5x15"ea 5x15" ea 5x15" ea          Scalene stretch 5x15"ea 5x15"ea HEP          UT and levator scap stretch 5x15" ea 3x30" ea HEP                                                 Ther Activity                                       Gait Training                                       Modalities

## 2020-07-06 ENCOUNTER — APPOINTMENT (OUTPATIENT)
Dept: PHYSICAL THERAPY | Facility: CLINIC | Age: 40
End: 2020-07-06
Payer: COMMERCIAL

## 2020-07-08 ENCOUNTER — OFFICE VISIT (OUTPATIENT)
Dept: PHYSICAL THERAPY | Facility: CLINIC | Age: 40
End: 2020-07-08
Payer: COMMERCIAL

## 2020-07-08 DIAGNOSIS — S46.012A STRAIN OF LEFT ROTATOR CUFF CAPSULE, INITIAL ENCOUNTER: ICD-10-CM

## 2020-07-08 DIAGNOSIS — M75.42 IMPINGEMENT SYNDROME OF LEFT SHOULDER: Primary | ICD-10-CM

## 2020-07-08 PROCEDURE — 97110 THERAPEUTIC EXERCISES: CPT

## 2020-07-08 PROCEDURE — 97140 MANUAL THERAPY 1/> REGIONS: CPT

## 2020-07-08 PROCEDURE — 97112 NEUROMUSCULAR REEDUCATION: CPT

## 2020-07-08 NOTE — PROGRESS NOTES
Daily Note     Today's date: 2020  Patient name: Neida Herrera  : 1980  MRN: [id number]  Referring provider: Maria Alejandra Arzola MD  Dx:   Encounter Diagnosis     ICD-10-CM    1  Impingement syndrome of left shoulder M75 42    2  Strain of left rotator cuff capsule, initial encounter S46 012A        Start Time: 1630  Stop Time: 1715  Total time in clinic (min): 45 minutes    Subjective: Patient noted she feels better when she does her exercises each day  Patient noted she was sore after last session  Objective: See treatment diary below      Assessment: Patient continues to improve with strength 2* less pain during functional activities  PT noted increased trigger points in the post rotator cuff  After manual techniques the patient noted improvements in pain levels  Patient did not use a weight during sidelying ER 2* increased soreness after last session  Patient would benefit from continued PT to allow the patient to return to her PLOF  Plan: Continue per plan of care        Precautions: none      Manuals  7         L shoulder post and inferior mob nv MW  Grade  III MW  Grade  III MW  Grade  III         Pec STM nv  MW Post rotator cuff  MW         Leuko tape L shoulder for posture MW MW MW                       Neuro Re-Ed             Scapular retraction 2x10  5" hold 2x10  5" hold HEP          Serratus punch nv 2x10 ea 3x10 ea 3x10          sidelying ER nv 2x10 ea 2x10 ea  1# 2x10 ea         Prone I and T nv (I)  2x10 2x10 ea 2x10 ea         Rows and extensions   Lime  2x10 ea Lime  2x10 ea                                   Ther Ex             UBE retro nv 10' 10' 10'         pec stretch nv standing  5x15" standing  5x15" ea standing  5x15"ea         Open books 5x15"ea 5x15" ea 5x15" ea 5x15"ea         Scalene stretch 5x15"ea 5x15"ea HEP          UT and levator scap stretch 5x15" ea 3x30" ea HEP          Skier stretch    5x15"                                   Ther Activity Gait Training                                       Modalities

## 2020-07-09 ENCOUNTER — OFFICE VISIT (OUTPATIENT)
Dept: PHYSICAL THERAPY | Facility: CLINIC | Age: 40
End: 2020-07-09
Payer: COMMERCIAL

## 2020-07-09 DIAGNOSIS — M75.42 IMPINGEMENT SYNDROME OF LEFT SHOULDER: Primary | ICD-10-CM

## 2020-07-09 DIAGNOSIS — S46.012A STRAIN OF LEFT ROTATOR CUFF CAPSULE, INITIAL ENCOUNTER: ICD-10-CM

## 2020-07-09 PROCEDURE — 97110 THERAPEUTIC EXERCISES: CPT

## 2020-07-09 PROCEDURE — 97140 MANUAL THERAPY 1/> REGIONS: CPT

## 2020-07-09 NOTE — PROGRESS NOTES
Daily Note     Today's date: 2020  Patient name: Carey Del Cid  : 1980  MRN: [id number]  Referring provider: Wicho Aquino MD  Dx:   Encounter Diagnosis     ICD-10-CM    1  Impingement syndrome of left shoulder M75 42    2  Strain of left rotator cuff capsule, initial encounter S46 012A        Start Time: 1630  Stop Time: 1730  Total time in clinic (min): 60 minutes    Subjective: Patient noted she felt much better from last session  Patient noted she has minimal to no pain today  Objective: See treatment diary below      Assessment: PT taped her L shoulder as well as performed trigger point release and mobilizations to assist c pain  Patient noted improvements post manuals  PT introduced standing I, Y, and T to assist c scapular strengthening and stabilization  Patient required min VCs for form  Patient would benefit from continued PT to allow the patient to return to her PLOF  Plan: Continue per plan of care        Precautions: none      Manuals         L shoulder post and inferior mob nv MW  Grade  III MW  Grade  III MW  Grade  III MW  Grade  III        Pec STM nv  MW Post rotator cuff  MW Post R        Leuko tape L shoulder for posture MW MW MW  MW                     Neuro Re-Ed             Scapular retraction 2x10  5" hold 2x10  5" hold HEP          Serratus punch nv 2x10 ea 3x10 ea 3x10  2x10  2#        sidelying ER nv 2x10 ea 2x10 ea  1# 2x10 ea 2x10 ea        Prone I and T nv (I)  2x10 2x10 ea 2x10 ea 2x10 ea        Rows and extensions   Lime  2x10 ea Lime  2x10 ea Lime  2x10 ea        Standing I, Y,T     2x10 ea                     Ther Ex             UBE retro nv 10' 10' 10' 10'        pec stretch nv standing  5x15" standing  5x15" ea standing  5x15"ea 5x15"        Open books 5x15"ea 5x15" ea 5x15" ea 5x15"ea         Scalene stretch 5x15"ea 5x15"ea HEP          UT and levator scap stretch 5x15" ea 3x30" ea HEP          Skier stretch    5x15" 5x15" Ther Activity                                       Gait Training                                       Modalities

## 2020-07-13 ENCOUNTER — OFFICE VISIT (OUTPATIENT)
Dept: PHYSICAL THERAPY | Facility: CLINIC | Age: 40
End: 2020-07-13
Payer: COMMERCIAL

## 2020-07-13 DIAGNOSIS — S46.012A STRAIN OF LEFT ROTATOR CUFF CAPSULE, INITIAL ENCOUNTER: ICD-10-CM

## 2020-07-13 DIAGNOSIS — M75.42 IMPINGEMENT SYNDROME OF LEFT SHOULDER: Primary | ICD-10-CM

## 2020-07-13 PROCEDURE — 97140 MANUAL THERAPY 1/> REGIONS: CPT

## 2020-07-13 PROCEDURE — 97112 NEUROMUSCULAR REEDUCATION: CPT

## 2020-07-13 PROCEDURE — 97110 THERAPEUTIC EXERCISES: CPT

## 2020-07-13 NOTE — PROGRESS NOTES
Daily Note     Today's date: 2020  Patient name: Jenny Del Real  : 1980  MRN: [id number]  Referring provider: Ashley Mahoney MD  Dx:   Encounter Diagnosis     ICD-10-CM    1  Impingement syndrome of left shoulder M75 42    2  Strain of left rotator cuff capsule, initial encounter S46 012A        Start Time: 1615  Stop Time: 1715  Total time in clinic (min): 60 minutes    Subjective: Patient noted the tape continues to help her  Patient noted she has less pain at night with a pillow under the L shoulder when in supine  Objective: See treatment diary below      Assessment: PT held on taping due to improvements in pain  Patient required moderate cues for standing I, Y, Ts  Patient performed serratus punches in a quad position to assist c closed kinetic chain strengthening  Patient improved with ROM post manuals  Patient would benefit from continued PT to allow the patient to return to her PLOF  Plan: Continue per plan of care        Precautions: none      Manuals        L shoulder post and inferior mob nv MW  Grade  III MW  Grade  III MW  Grade  III MW  Grade  III MW  Grade  III       Pec STM nv  MW Post rotator cuff  MW Post R Post rotator cuff  MW       Leuko tape L shoulder for posture MW MW MW  MW                     Neuro Re-Ed             Scapular retraction 2x10  5" hold 2x10  5" hold HEP          Serratus punch nv 2x10 ea 3x10 ea 3x10  2x10  2# Quad  2x10  +TA       sidelying ER nv 2x10 ea 2x10 ea  1# 2x10 ea 2x10 ea 2x10 ea       Prone I and T nv (I)  2x10 2x10 ea 2x10 ea 2x10 ea 2x10 ea       Rows and extensions   Lime  2x10 ea Lime  2x10 ea Lime  2x10 ea Lime  2x10       Standing I, Y,T     2x10 ea 2x10 ea                    Ther Ex             UBE retro nv 10' 10' 10' 10' 10'       pec stretch nv standing  5x15" standing  5x15" ea standing  5x15"ea 5x15" 5x15"       Open books 5x15"ea 5x15" ea 5x15" ea 5x15"ea  5x15"ea       Scalene stretch 5x15"ea 5x15"ea HEP UT and levator scap stretch 5x15" ea 3x30" ea HEP          Skier stretch    5x15" 5x15" 5x15"                                 Ther Activity                                       Gait Training                                       Modalities

## 2020-07-15 ENCOUNTER — OFFICE VISIT (OUTPATIENT)
Dept: PHYSICAL THERAPY | Facility: CLINIC | Age: 40
End: 2020-07-15
Payer: COMMERCIAL

## 2020-07-15 DIAGNOSIS — S46.012A STRAIN OF LEFT ROTATOR CUFF CAPSULE, INITIAL ENCOUNTER: ICD-10-CM

## 2020-07-15 DIAGNOSIS — M75.42 IMPINGEMENT SYNDROME OF LEFT SHOULDER: Primary | ICD-10-CM

## 2020-07-15 PROCEDURE — 97110 THERAPEUTIC EXERCISES: CPT

## 2020-07-15 PROCEDURE — 97140 MANUAL THERAPY 1/> REGIONS: CPT

## 2020-07-15 PROCEDURE — 97112 NEUROMUSCULAR REEDUCATION: CPT

## 2020-07-15 NOTE — PROGRESS NOTES
Daily Note     Today's date: 7/15/2020  Patient name: Kalen Haddad  : 1980  MRN: [id number]  Referring provider: Valdez Zeng MD  Dx:   Encounter Diagnosis     ICD-10-CM    1  Impingement syndrome of left shoulder M75 42    2  Strain of left rotator cuff capsule, initial encounter S46 012A        Start Time: 1630  Stop Time: 1730  Total time in clinic (min): 60 minutes    Subjective: Patient noted that her shoulder has been feeling pretty good  Patient noted she continues to have some pain when sleeping  Objective: See treatment diary below      Assessment: PT introduced walking shoulder ER/IR exercise to assist c endurance  Patient required min VCs for form throughout the session  Patient continues to demonstrate improvements with strength 2* increased reps  Patient continues to improve with ROM post manuals  Patient would benefit from continued PT to allow the patient to return to her PLOF  Plan: Continue per plan of care        Precautions: none      Manuals 6/25 6/29 7/1 7/8 7/9 7/13 7/15      L shoulder post and inferior mob nv MW  Grade  III MW  Grade  III MW  Grade  III MW  Grade  III MW  Grade  III MW  Grade  IV      Pec STM nv  MW Post rotator cuff  MW Post R Post rotator cuff  MW       Leuko tape L shoulder for posture MW MW MW  MW  MW                   Neuro Re-Ed             Scapular retraction 2x10  5" hold 2x10  5" hold HEP          Serratus punch nv 2x10 ea 3x10 ea 3x10  2x10  2# Quad  2x10  +TA Table plank  2x10      sidelying ER nv 2x10 ea 2x10 ea  1# 2x10 ea 2x10 ea 2x10 ea 2x10 ea  1#      Prone I and T nv (I)  2x10 2x10 ea 2x10 ea 2x10 ea 2x10 ea 3x10 ea      Rows and extensions   Lime  2x10 ea Lime  2x10 ea Lime  2x10 ea Lime  2x10 Lime  2x10 ea      Standing I, Y,T     2x10 ea 2x10 ea       Walking shoulder ER/IR with band       PTB  x10      Ther Ex             UBE retro nv 10' 10' 10' 10' 10' 10'      pec stretch nv standing  5x15" standing  5x15" ea standing  5x15"ea 5x15" 5x15" 5x15"      Open books 5x15"ea 5x15" ea 5x15" ea 5x15"ea  5x15"ea       Scalene stretch 5x15"ea 5x15"ea HEP          UT and levator scap stretch 5x15" ea 3x30" ea HEP          Skier stretch    5x15" 5x15" 5x15" 5x15"                                Ther Activity                                       Gait Training                                       Modalities

## 2020-07-20 ENCOUNTER — APPOINTMENT (OUTPATIENT)
Dept: PHYSICAL THERAPY | Facility: CLINIC | Age: 40
End: 2020-07-20
Payer: COMMERCIAL

## 2020-07-22 ENCOUNTER — OFFICE VISIT (OUTPATIENT)
Dept: PHYSICAL THERAPY | Facility: CLINIC | Age: 40
End: 2020-07-22
Payer: COMMERCIAL

## 2020-07-22 DIAGNOSIS — S46.012A STRAIN OF LEFT ROTATOR CUFF CAPSULE, INITIAL ENCOUNTER: ICD-10-CM

## 2020-07-22 DIAGNOSIS — M75.42 IMPINGEMENT SYNDROME OF LEFT SHOULDER: Primary | ICD-10-CM

## 2020-07-22 PROCEDURE — 97110 THERAPEUTIC EXERCISES: CPT

## 2020-07-22 PROCEDURE — 97140 MANUAL THERAPY 1/> REGIONS: CPT

## 2020-07-22 PROCEDURE — 97112 NEUROMUSCULAR REEDUCATION: CPT

## 2020-07-22 NOTE — PROGRESS NOTES
Daily Note     Today's date: 2020  Patient name: Jenny Del Real  : 1980  MRN: [id number]  Referring provider: Ashley Mahoney MD  Dx:   Encounter Diagnosis     ICD-10-CM    1  Impingement syndrome of left shoulder M75 42    2  Strain of left rotator cuff capsule, initial encounter S46 012A        Start Time: 1400  Stop Time: 1455  Total time in clinic (min): 55 minutes    Subjective: Patient noted that the shoulder is feeling much better  Patient noted she only has some pain with push ups  Objective: See treatment diary below      Assessment: PT educated the patient on alternative for push ups during her work outs  Patient was able to perform the wall push ups with minimal to no pain if the serratus anterior was activated  Patient continues to improve with form 2* improved strength  Patient's PROM was full and almost pain free  After mobilizations she did not have pain  Patient would benefit from continued PT to allow the patient to return to her PLOF  Plan: Continue per plan of care        Precautions: none      Manuals 6/25 6/29 7/1 7/8 7/9 7/13 7/15 7/22     L shoulder post and inferior mob nv MW  Grade  III MW  Grade  III MW  Grade  III MW  Grade  III MW  Grade  III MW  Grade  IV MW  Grade  IV     Pec STM nv  MW Post rotator cuff  MW Post R Post rotator cuff  MW       Leuko tape L shoulder for posture MW MW MW  MW  MW                   Neuro Re-Ed             Scapular retraction 2x10  5" hold 2x10  5" hold HEP          Serratus punch nv 2x10 ea 3x10 ea 3x10  2x10  2# Quad  2x10  +TA Table plank  2x10 Wall push up with plus  2x10     sidelying ER nv 2x10 ea 2x10 ea  1# 2x10 ea 2x10 ea 2x10 ea 2x10 ea  1# 2x10 ea  1#     Prone I and T nv (I)  2x10 2x10 ea 2x10 ea 2x10 ea 2x10 ea 3x10 ea 3x10 ea     Rows and extensions   Lime  2x10 ea Lime  2x10 ea Lime  2x10 ea Lime  2x10 Lime  2x10 ea BTB  2x10 ea     Standing I, Y,T     2x10 ea 2x10 ea  2x10  Ea  1#     Walking shoulder ER/IR with band PTB  x10 PTB  x10 ea     Ther Ex             UBE retro nv 10' 10' 10' 10' 10' 10' 10'     pec stretch nv standing  5x15" standing  5x15" ea standing  5x15"ea 5x15" 5x15" 5x15" 5x15"     Open books 5x15"ea 5x15" ea 5x15" ea 5x15"ea  5x15"ea       Scalene stretch 5x15"ea 5x15"ea HEP          UT and levator scap stretch 5x15" ea 3x30" ea HEP          Skier stretch    5x15" 5x15" 5x15" 5x15" 5x15"                               Ther Activity                                       Gait Training                                       Modalities

## 2020-07-27 ENCOUNTER — OFFICE VISIT (OUTPATIENT)
Dept: PHYSICAL THERAPY | Facility: CLINIC | Age: 40
End: 2020-07-27
Payer: COMMERCIAL

## 2020-07-27 DIAGNOSIS — S46.012A STRAIN OF LEFT ROTATOR CUFF CAPSULE, INITIAL ENCOUNTER: ICD-10-CM

## 2020-07-27 DIAGNOSIS — M75.42 IMPINGEMENT SYNDROME OF LEFT SHOULDER: Primary | ICD-10-CM

## 2020-07-27 PROCEDURE — 97112 NEUROMUSCULAR REEDUCATION: CPT

## 2020-07-27 PROCEDURE — 97140 MANUAL THERAPY 1/> REGIONS: CPT

## 2020-07-27 NOTE — PROGRESS NOTES
Daily Note     Today's date: 2020  Patient name: Alicia Corral  : 1980  MRN: [id number]  Referring provider: Helder Hernadez MD  Dx:   Encounter Diagnosis     ICD-10-CM    1  Impingement syndrome of left shoulder M75 42    2  Strain of left rotator cuff capsule, initial encounter S46 012A        Start Time: 1700  Stop Time: 1745  Total time in clinic (min): 45 minutes    Subjective: Patient noted she feels that she slept on her shoulder the wrong way so it is a little sore  Patient noted overall she is feeling much better  Objective: See treatment diary below      Assessment: Patient required cues throughout the session for form, however she continues to increase strength 2* less breaks and increased resistance  Patient improved with ROM post manuals  Patient would benefit from continued PT to allow the patient to return to her PLOF  Plan: Continue per plan of care        Precautions: none      Manuals 6/25 6/29 7/1 7/8 7/9 7/13 7/15 7/22 7/27    L shoulder post and inferior mob nv MW  Grade  III MW  Grade  III MW  Grade  III MW  Grade  III MW  Grade  III MW  Grade  IV MW  Grade  IV MW  Grade  IV    Pec STM nv  MW Post rotator cuff  MW Post R Post rotator cuff  MW       Leuko tape L shoulder for posture MW MW MW  MW  MW                   Neuro Re-Ed             Scapular retraction 2x10  5" hold 2x10  5" hold HEP          Serratus punch nv 2x10 ea 3x10 ea 3x10  2x10  2# Quad  2x10  +TA Table plank  2x10 Wall push up with plus  2x10     sidelying ER nv 2x10 ea 2x10 ea  1# 2x10 ea 2x10 ea 2x10 ea 2x10 ea  1# 2x10 ea  1# 2x10 ea  1#    Prone I and T nv (I)  2x10 2x10 ea 2x10 ea 2x10 ea 2x10 ea 3x10 ea 3x10 ea 3x10 ea    Rows and extensions   Lime  2x10 ea Lime  2x10 ea Lime  2x10 ea Lime  2x10 Lime  2x10 ea BTB  2x10 ea BTB  3x10 ea    Standing I, Y,T     2x10 ea 2x10 ea  2x10  Ea  1# 2x10  Ea  1#    Walking shoulder ER/IR with band       PTB  x10 PTB  x10 ea PTB  x10 ea    Ther Ex             UBE retro nv 10' 10' 10' 10' 10' 10' 10' 10'    pec stretch nv standing  5x15" standing  5x15" ea standing  5x15"ea 5x15" 5x15" 5x15" 5x15" 5x15"    Open books 5x15"ea 5x15" ea 5x15" ea 5x15"ea  5x15"ea       Scalene stretch 5x15"ea 5x15"ea HEP          UT and levator scap stretch 5x15" ea 3x30" ea HEP          Skier stretch    5x15" 5x15" 5x15" 5x15" 5x15" 5x15"                              Ther Activity                                       Gait Training                                       Modalities

## 2020-07-29 ENCOUNTER — OFFICE VISIT (OUTPATIENT)
Dept: PHYSICAL THERAPY | Facility: CLINIC | Age: 40
End: 2020-07-29
Payer: COMMERCIAL

## 2020-07-29 DIAGNOSIS — M75.42 IMPINGEMENT SYNDROME OF LEFT SHOULDER: Primary | ICD-10-CM

## 2020-07-29 DIAGNOSIS — S46.012A STRAIN OF LEFT ROTATOR CUFF CAPSULE, INITIAL ENCOUNTER: ICD-10-CM

## 2020-07-29 PROCEDURE — 97140 MANUAL THERAPY 1/> REGIONS: CPT

## 2020-07-29 PROCEDURE — 97112 NEUROMUSCULAR REEDUCATION: CPT

## 2020-07-29 NOTE — LETTER
2020    Cydney Ybarra MD  [facility] [address] [place] [address]    Patient: Meghan Prieto   YOB: 1980   Date of Visit: 2020     Encounter Diagnosis     ICD-10-CM    1  Impingement syndrome of left shoulder M75 42    2  Strain of left rotator cuff capsule, initial encounter S46 012A        Dear Dr Jud Yin:    Thank you for your recent referral of Meghan Prieto  Please review the attached evaluation summary from Porsche's recent visit  Please verify that you agree with the plan of care by signing the attached order  If you have any questions or concerns, please do not hesitate to call  I sincerely appreciate the opportunity to share in the care of one of your patients and hope to have another opportunity to work with you in the near future  Sincerely,    Forest Glasgow, PT      Referring Provider:      I certify that I have read the below Plan of Care and certify the need for these services furnished under this plan of treatment while under my care  MD Cristel Sifuentes [address] [place] [address] Avenue: [phone]          PT Re-Evaluation  and PT Discharge    Today's date: 2020  Patient name: Meghan Prieto  : 1980  MRN: [id number]  Referring provider: Wild Mitchell MD  Dx:   Encounter Diagnosis     ICD-10-CM    1  Impingement syndrome of left shoulder M75 42    2  Strain of left rotator cuff capsule, initial encounter S46 012A        Start Time: 1710  Stop Time: 1800  Total time in clinic (min): 50 minutes    Assessment  Assessment details: Since starting physical therapy the patient has improved with strength, endurance, ROM and activity tolerance  Patient is able to perform ADLs, sleep and her HEP (I) and pain free  Due to noted improvements, the patient will be D/C from PT with a HEP  PT and patient agree with POC  Understanding of Dx/Px/POC: good   Prognosis: good    Goals  STG: In four weeks the patient will:    1   Be (I) with her HEP  (MET)  2  Increase shoulder and scapular strength to 4+/5 MMT score to assist c ADLs  (MET)  3  Increase shoulder ER and IR ROM by 10 degrees to assist c dressing (MET)      LTG: In six weeks, the patient will:    1  Increase FOTO score to 72 to demonstrate improvements in symptoms and function  (MET)  2  Demonstrate full L shoulder AROM without pain (MET)  3  Perform 45-60 mins of typing without pain  (MET)  4  Increase shoulder and scapular strength to 5/5 MMT score to assist c prolonged activities  (in progress)  5  Sleep without pain for 6-8 hours  (MET)  6  Perform a push up without pain  (in progress)  7  Perform her exercise work out without pain   (in progress)      Plan  Plan of Care beginning date: 2020  Plan of Care expiration date: 2020  Treatment plan discussed with: patient        Subjective Evaluation    History of Present Illness  Mechanism of injury: Patient noted that she is 90% back to her PLOF  Patient noted she is able to complete ADLs and her HEP (I)  Patient noted at times she has some pain with shoulder abduction, however it is not all the time             Recurrent probem    Quality of life: good    Pain  Current pain ratin  At best pain ratin  At worst pain ratin  Location: Posterior/lateral L shoulder  Quality: sharp  Relieving factors: heat, ice and medications  Aggravating factors: overhead activity, lifting and keyboarding  Progression: improved    Social Support  Steps to enter house: yes (3 [address])  Lives in: [facility]  Lives with: young children    Employment status: working  Hand dominance: right  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      Diagnostic Tests  No diagnostic tests performed  Patient Goals  Patient goal: "to workout without pain " (90% MET)"to do a push up without pain " (90% MET)"to roll around in bed without pain " (90% MET)        Objective     Postural Observations  Seated posture: good  Standing posture: good  Correction of posture: makes symptoms better        Cervical/Thoracic Screen   Cervical range of motion within normal limits  Cervical range of motion within normal limits with the following exceptions: Limited in L cervical sidebending and rotation, very slightly    Neurological Testing     Sensation     Shoulder   Left Shoulder   Intact: light touch    Right Shoulder   Intact: light touch    Active Range of Motion   Left Shoulder   Flexion: 175 degrees   Abduction: 175 degrees   External rotation BTH: T1   Internal rotation BTB: L2     Right Shoulder   Normal active range of motion    Strength/Myotome Testing     Left Shoulder     Planes of Motion   Flexion: 4+   Abduction: 4+   External rotation at 0°:  4+   Internal rotation at 0°:  4+     Isolated Muscles   Biceps: 4+   Triceps: 4+     Right Shoulder     Planes of Motion   Flexion: 4+   Abduction: 4+   External rotation at 0°:  4+   Internal rotation at 0°:  4+     Isolated Muscles   Biceps: 4+   Triceps: 4+     Additional Strength Details  Wrist flexion: 4+/5  Wrist Extension: 4+/5    Tests     Left Shoulder   Negative belly press, drop arm, empty can, lift-off and Neer's                Precautions: none      Manuals 6/25 6/29 7/1 7/8 7/9 7/13 7/15 7/22 7/27 7/29   L shoulder post and inferior mob nv MW  Grade  III MW  Grade  III MW  Grade  III MW  Grade  III MW  Grade  III MW  Grade  IV MW  Grade  IV MW  Grade  IV MW  Grade  IV   Pec STM nv  MW Post rotator cuff  MW Post R Post rotator cuff  MW       Leuko tape L shoulder for posture MW MW MW  MW  MW      Re-eval          MW   Neuro Re-Ed             Scapular retraction 2x10  5" hold 2x10  5" hold HEP          Serratus punch nv 2x10 ea 3x10 ea 3x10  2x10  2# Quad  2x10  +TA Table plank  2x10 Wall push up with plus  2x10     sidelying ER nv 2x10 ea 2x10 ea  1# 2x10 ea 2x10 ea 2x10 ea 2x10 ea  1# 2x10 ea  1# 2x10 ea  1# 2x10 ea  1#   Prone I and T nv (I)  2x10 2x10 ea 2x10 ea 2x10 ea 2x10 ea 3x10 ea 3x10 ea 3x10 ea 3x10 ea  On ball   Rows and extensions   Lime  2x10 ea Lime  2x10 ea Lime  2x10 ea Lime  2x10 Lime  2x10 ea BTB  2x10 ea BTB  3x10 ea BTB  3x10 ea   Standing I, Y,T     2x10 ea 2x10 ea  2x10  Ea  1# 2x10  Ea  1# 2x10 ea  1#   Walking shoulder ER/IR with band       PTB  x10 PTB  x10 ea PTB  x10 ea PTB  x10 ea   First rib self mob with strap          x10 ea   Ther Ex             UBE retro nv 10' 10' 10' 10' 10' 10' 10' 10' 10'   pec stretch nv standing  5x15" standing  5x15" ea standing  5x15"ea 5x15" 5x15" 5x15" 5x15" 5x15" 5x15"   Open books 5x15"ea 5x15" ea 5x15" ea 5x15"ea  5x15"ea       Scalene stretch 5x15"ea 5x15"ea HEP          UT and levator scap stretch 5x15" ea 3x30" ea HEP          Skier stretch    5x15" 5x15" 5x15" 5x15" 5x15" 5x15" 5x15"                              Ther Activity                                       Gait Training                                       Modalities

## 2020-07-29 NOTE — PROGRESS NOTES
PT Re-Evaluation  and PT Discharge    Today's date: 2020  Patient name: Denisse Murray  : 1980  MRN: [id number]  Referring provider: Guillermo Floyd MD  Dx:   Encounter Diagnosis     ICD-10-CM    1  Impingement syndrome of left shoulder M75 42    2  Strain of left rotator cuff capsule, initial encounter S46 012A        Start Time: 1710  Stop Time: 1800  Total time in clinic (min): 50 minutes    Assessment  Assessment details: Since starting physical therapy the patient has improved with strength, endurance, ROM and activity tolerance  Patient is able to perform ADLs, sleep and her HEP (I) and pain free  Due to noted improvements, the patient will be D/C from PT with a HEP  PT and patient agree with POC  Understanding of Dx/Px/POC: good   Prognosis: good    Goals  STG: In four weeks the patient will:    1  Be (I) with her HEP  (MET)  2  Increase shoulder and scapular strength to 4+/5 MMT score to assist c ADLs  (MET)  3  Increase shoulder ER and IR ROM by 10 degrees to assist c dressing (MET)      LTG: In six weeks, the patient will:    1  Increase FOTO score to 72 to demonstrate improvements in symptoms and function  (MET)  2  Demonstrate full L shoulder AROM without pain (MET)  3  Perform 45-60 mins of typing without pain  (MET)  4  Increase shoulder and scapular strength to 5/5 MMT score to assist c prolonged activities  (in progress)  5  Sleep without pain for 6-8 hours  (MET)  6  Perform a push up without pain  (in progress)  7  Perform her exercise work out without pain   (in progress)      Plan  Plan of Care beginning date: 2020  Plan of Care expiration date: 2020  Treatment plan discussed with: patient        Subjective Evaluation    History of Present Illness  Mechanism of injury: Patient noted that she is 90% back to her PLOF  Patient noted she is able to complete ADLs and her HEP (I)   Patient noted at times she has some pain with shoulder abduction, however it is not all the time            Recurrent probem    Quality of life: good    Pain  Current pain ratin  At best pain ratin  At worst pain ratin  Location: Posterior/lateral L shoulder  Quality: sharp  Relieving factors: heat, ice and medications  Aggravating factors: overhead activity, lifting and keyboarding  Progression: improved    Social Support  Steps to enter house: yes (3 [address]ESS[address]A)  Lives in: [facility]  Lives with: young children    Employment status: working  Hand dominance: right  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      Diagnostic Tests  No diagnostic tests performed  Patient Goals  Patient goal: "to workout without pain " (90% MET)"to do a push up without pain " (90% MET)"to roll around in bed without pain " (90% MET)        Objective     Postural Observations  Seated posture: good  Standing posture: good  Correction of posture: makes symptoms better        Cervical/Thoracic Screen   Cervical range of motion within normal limits  Cervical range of motion within normal limits with the following exceptions: Limited in L cervical sidebending and rotation, very slightly    Neurological Testing     Sensation     Shoulder   Left Shoulder   Intact: light touch    Right Shoulder   Intact: light touch    Active Range of Motion   Left Shoulder   Flexion: 175 degrees   Abduction: 175 degrees   External rotation BTH: T1   Internal rotation BTB: L2     Right Shoulder   Normal active range of motion    Strength/Myotome Testing     Left Shoulder     Planes of Motion   Flexion: 4+   Abduction: 4+   External rotation at 0°: 4+   Internal rotation at 0°: 4+     Isolated Muscles   Biceps: 4+   Triceps: 4+     Right Shoulder     Planes of Motion   Flexion: 4+   Abduction: 4+   External rotation at 0°: 4+   Internal rotation at 0°: 4+     Isolated Muscles   Biceps: 4+   Triceps: 4+     Additional Strength Details  Wrist flexion: 4+/5  Wrist Extension: 4+/5    Tests     Left Shoulder   Negative belly press, drop arm, empty can, lift-off and Neer's                Precautions: none      Manuals 6/25 6/29 7/1 7/8 7/9 7/13 7/15 7/22 7/27 7/29   L shoulder post and inferior mob nv MW  Grade  III MW  Grade  III MW  Grade  III MW  Grade  III MW  Grade  III MW  Grade  IV MW  Grade  IV MW  Grade  IV MW  Grade  IV   Pec STM nv  MW Post rotator cuff  MW Post R Post rotator cuff  MW       Leuko tape L shoulder for posture MW MW MW  MW  MW      Re-eval          MW   Neuro Re-Ed             Scapular retraction 2x10  5" hold 2x10  5" hold HEP          Serratus punch nv 2x10 ea 3x10 ea 3x10  2x10  2# Quad  2x10  +TA Table plank  2x10 Wall push up with plus  2x10     sidelying ER nv 2x10 ea 2x10 ea  1# 2x10 ea 2x10 ea 2x10 ea 2x10 ea  1# 2x10 ea  1# 2x10 ea  1# 2x10 ea  1#   Prone I and T nv (I)  2x10 2x10 ea 2x10 ea 2x10 ea 2x10 ea 3x10 ea 3x10 ea 3x10 ea 3x10 ea  On ball   Rows and extensions   Lime  2x10 ea Lime  2x10 ea Lime  2x10 ea Lime  2x10 Lime  2x10 ea BTB  2x10 ea BTB  3x10 ea BTB  3x10 ea   Standing I, Y,T     2x10 ea 2x10 ea  2x10  Ea  1# 2x10  Ea  1# 2x10 ea  1#   Walking shoulder ER/IR with band       PTB  x10 PTB  x10 ea PTB  x10 ea PTB  x10 ea   First rib self mob with strap          x10 ea   Ther Ex             UBE retro nv 10' 10' 10' 10' 10' 10' 10' 10' 10'   pec stretch nv standing  5x15" standing  5x15" ea standing  5x15"ea 5x15" 5x15" 5x15" 5x15" 5x15" 5x15"   Open books 5x15"ea 5x15" ea 5x15" ea 5x15"ea  5x15"ea       Scalene stretch 5x15"ea 5x15"ea HEP          UT and levator scap stretch 5x15" ea 3x30" ea HEP          Skier stretch    5x15" 5x15" 5x15" 5x15" 5x15" 5x15" 5x15"                              Ther Activity                                       Gait Training                                       Modalities

## 2020-07-30 ENCOUNTER — TRANSCRIBE ORDERS (OUTPATIENT)
Dept: PHYSICAL THERAPY | Facility: CLINIC | Age: 40
End: 2020-07-30

## 2020-07-30 DIAGNOSIS — M75.42 IMPINGEMENT SYNDROME OF LEFT SHOULDER: Primary | ICD-10-CM

## 2020-07-30 DIAGNOSIS — S46.012A STRAIN OF TENDON OF LEFT ROTATOR CUFF, INITIAL ENCOUNTER: ICD-10-CM

## 2023-05-02 DIAGNOSIS — Z12.31 SCREENING MAMMOGRAM FOR BREAST CANCER: Primary | ICD-10-CM

## 2023-05-02 RX ORDER — LEVOTHYROXINE SODIUM 0.07 MG/1
TABLET ORAL
COMMUNITY
Start: 2021-03-02

## 2023-05-02 RX ORDER — FLUCONAZOLE 150 MG/1
TABLET ORAL
COMMUNITY
Start: 2023-04-21 | End: 2023-05-03 | Stop reason: ALTCHOICE

## 2023-05-03 ENCOUNTER — APPOINTMENT (OUTPATIENT)
Dept: LAB | Facility: CLINIC | Age: 43
End: 2023-05-03

## 2023-05-03 ENCOUNTER — OFFICE VISIT (OUTPATIENT)
Dept: FAMILY MEDICINE CLINIC | Facility: CLINIC | Age: 43
End: 2023-05-03

## 2023-05-03 VITALS
WEIGHT: 183.4 LBS | BODY MASS INDEX: 29.47 KG/M2 | HEART RATE: 72 BPM | RESPIRATION RATE: 16 BRPM | TEMPERATURE: 98.2 F | SYSTOLIC BLOOD PRESSURE: 112 MMHG | HEIGHT: 66 IN | DIASTOLIC BLOOD PRESSURE: 67 MMHG | OXYGEN SATURATION: 100 %

## 2023-05-03 DIAGNOSIS — E03.9 ACQUIRED HYPOTHYROIDISM: ICD-10-CM

## 2023-05-03 DIAGNOSIS — E55.9 VITAMIN D DEFICIENCY: ICD-10-CM

## 2023-05-03 DIAGNOSIS — Z12.31 SCREENING MAMMOGRAM FOR BREAST CANCER: ICD-10-CM

## 2023-05-03 DIAGNOSIS — Z00.00 ENCOUNTER FOR WELLNESS EXAMINATION IN ADULT: Primary | ICD-10-CM

## 2023-05-03 DIAGNOSIS — R41.840 ATTENTION AND CONCENTRATION DEFICIT: ICD-10-CM

## 2023-05-03 LAB
25(OH)D3 SERPL-MCNC: 34.3 NG/ML (ref 30–100)
ALBUMIN SERPL BCP-MCNC: 4 G/DL (ref 3.5–5)
ALP SERPL-CCNC: 50 U/L (ref 46–116)
ALT SERPL W P-5'-P-CCNC: 24 U/L (ref 12–78)
ANION GAP SERPL CALCULATED.3IONS-SCNC: 3 MMOL/L (ref 4–13)
AST SERPL W P-5'-P-CCNC: 17 U/L (ref 5–45)
BASOPHILS # BLD AUTO: 0.06 THOUSANDS/ÂΜL (ref 0–0.1)
BASOPHILS NFR BLD AUTO: 1 % (ref 0–1)
BILIRUB SERPL-MCNC: 0.53 MG/DL (ref 0.2–1)
BUN SERPL-MCNC: 15 MG/DL (ref 5–25)
CALCIUM SERPL-MCNC: 9.4 MG/DL (ref 8.3–10.1)
CHLORIDE SERPL-SCNC: 108 MMOL/L (ref 96–108)
CHOLEST SERPL-MCNC: 156 MG/DL
CO2 SERPL-SCNC: 27 MMOL/L (ref 21–32)
CREAT SERPL-MCNC: 0.79 MG/DL (ref 0.6–1.3)
EOSINOPHIL # BLD AUTO: 0.17 THOUSAND/ÂΜL (ref 0–0.61)
EOSINOPHIL NFR BLD AUTO: 3 % (ref 0–6)
ERYTHROCYTE [DISTWIDTH] IN BLOOD BY AUTOMATED COUNT: 12.8 % (ref 11.6–15.1)
GFR SERPL CREATININE-BSD FRML MDRD: 91 ML/MIN/1.73SQ M
GLUCOSE P FAST SERPL-MCNC: 96 MG/DL (ref 65–99)
HCT VFR BLD AUTO: 41.6 % (ref 34.8–46.1)
HDLC SERPL-MCNC: 77 MG/DL
HGB BLD-MCNC: 13 G/DL (ref 11.5–15.4)
IMM GRANULOCYTES # BLD AUTO: 0.02 THOUSAND/UL (ref 0–0.2)
IMM GRANULOCYTES NFR BLD AUTO: 0 % (ref 0–2)
LDLC SERPL CALC-MCNC: 72 MG/DL (ref 0–100)
LYMPHOCYTES # BLD AUTO: 1.67 THOUSANDS/ÂΜL (ref 0.6–4.47)
LYMPHOCYTES NFR BLD AUTO: 26 % (ref 14–44)
MCH RBC QN AUTO: 31.1 PG (ref 26.8–34.3)
MCHC RBC AUTO-ENTMCNC: 31.3 G/DL (ref 31.4–37.4)
MCV RBC AUTO: 100 FL (ref 82–98)
MONOCYTES # BLD AUTO: 0.49 THOUSAND/ÂΜL (ref 0.17–1.22)
MONOCYTES NFR BLD AUTO: 8 % (ref 4–12)
NEUTROPHILS # BLD AUTO: 3.95 THOUSANDS/ÂΜL (ref 1.85–7.62)
NEUTS SEG NFR BLD AUTO: 62 % (ref 43–75)
NRBC BLD AUTO-RTO: 0 /100 WBCS
PLATELET # BLD AUTO: 264 THOUSANDS/UL (ref 149–390)
PMV BLD AUTO: 10.2 FL (ref 8.9–12.7)
POTASSIUM SERPL-SCNC: 4.5 MMOL/L (ref 3.5–5.3)
PROT SERPL-MCNC: 7.5 G/DL (ref 6.4–8.4)
RBC # BLD AUTO: 4.18 MILLION/UL (ref 3.81–5.12)
SODIUM SERPL-SCNC: 138 MMOL/L (ref 135–147)
TRIGL SERPL-MCNC: 37 MG/DL
TSH SERPL DL<=0.05 MIU/L-ACNC: 4.5 UIU/ML (ref 0.45–4.5)
WBC # BLD AUTO: 6.36 THOUSAND/UL (ref 4.31–10.16)

## 2023-05-03 NOTE — PROGRESS NOTES
Name: Yoly Espnioza      : 1980      MRN: [id number]  Encounter Provider: Camilo Moore MD  Encounter Date: 5/3/2023   Encounter department: [address] [place] [address]      1  Encounter for wellness examination in adult  -     Ambulatory referral to Obstetrics / Gynecology; Future    2  Screening mammogram for breast cancer  -     Mammo screening bilateral w 3d & cad; Future    3  Acquired hypothyroidism  Assessment & Plan:  Levothyroxine 75 mcg daily  Previous thyroid ultrasound in : Heterogenous gland, no dominant nodules    Orders:  -     CBC and differential; Future  -     Comprehensive metabolic panel; Future  -     Lipid Panel with Direct LDL reflex; Future  -     TSH, 3rd generation; Future    4  Attention and concentration deficit  -     Ambulatory Referral to [facility]; Future    5  Vitamin D deficiency  -     Vitamin D 25 hydroxy; Future    The patient presents to establish care with our practice  Referral for ADHD testing  Proceed with labs, schedule annual mammogram, establish care with [facility]  Subjective     New patient well exam   Last PCP exam-  3/2022  Hypothyroidism - on levothyroxine 75 mcg  Thyroid US :Heterogeneous hypervascular thyroid gland, without discrete nodule  vit  D def - 4,000 IU daily    Tray of benign left breast excisional Bx    FHx  Breast CA : paternal aunt and cousin  Uncle - pancreatic CA    Regular menses, slightly more frequent lately  Lighter lately, not too painful  H/o depression and anxiety , under care of counselor  Concerned about concentration deficit  Feels that depression and anxiety symptoms have been well controlled        Review of Systems   Constitutional: Negative  HENT: Negative  Eyes: Negative  Respiratory: Negative  Cardiovascular: Negative  Gastrointestinal: Negative  Endocrine: Negative  Genitourinary: Negative  Musculoskeletal: Negative  Skin: Negative  "  Allergic/Immunologic: Negative  Neurological: Negative  Hematological: Negative  Psychiatric/Behavioral: Positive for decreased concentration  Past Medical History:   Diagnosis Date    Disease of thyroid gland      Past Surgical History:   Procedure Laterality Date     SECTION      SPINE SURGERY      C6-7 Fushion Aug 2010    TUBAL LIGATION       History reviewed  No pertinent family history  Social History     Socioeconomic History    Marital status:      Spouse name: None    Number of children: None    Years of education: None    Highest education level: None   Occupational History    None   Tobacco Use    Smoking status: Never    Smokeless tobacco: Never   Substance and Sexual Activity    Alcohol use: None    Drug use: None    Sexual activity: None   Other Topics Concern    None   Social History Narrative    None     Social Determinants of Health     Financial Resource Strain: Not on file   Food Insecurity: Not on file   Transportation Needs: Not on file   Physical Activity: Not on file   Stress: Not on file   Social Connections: Not on file   Intimate Partner Violence: Not on file   Housing Stability: Not on file     Current Outpatient Medications on File Prior to Visit   Medication Sig    Cholecalciferol 50 MCG ( UT) TABS Take 2,000 Units by mouth daily    levothyroxine 75 mcg tablet      No Known Allergies  Immunization History   Administered Date(s) Administered    COVID-19 PFIZER VACCINE 0 3 ML IM 2021, 2021    Tdap 2015       Objective     /67 (BP Location: Left arm, Patient Position: Sitting, Cuff Size: Large)   Pulse 72   Temp 98 2 °F (36 8 °C) (Temporal)   Resp 16   Ht 5' 5 5\" (1 664 m)   Wt 83 2 kg (183 lb 6 4 oz)   LMP 2023 (Exact Date)   SpO2 100%   BMI 30 06 kg/m²     Physical Exam  Vitals and nursing note reviewed  Constitutional:       General: She is not in acute distress       Appearance: Normal " appearance  She is well-developed  She is not ill-appearing  HENT:      Head: Normocephalic and atraumatic  Eyes:      General: No scleral icterus  Conjunctiva/sclera: Conjunctivae normal    Neck:      Thyroid: No thyromegaly  Vascular: No carotid bruit  Cardiovascular:      Rate and Rhythm: Normal rate and regular rhythm  Heart sounds: Normal heart sounds  No murmur heard  Pulmonary:      Effort: Pulmonary effort is normal  No respiratory distress  Breath sounds: Normal breath sounds  No wheezing  Abdominal:      General: There is no distension or abdominal bruit  Palpations: Abdomen is soft  Tenderness: There is no abdominal tenderness  Hernia: No hernia is present  Musculoskeletal:         General: Normal range of motion  Cervical back: Neck supple  No rigidity  Right lower leg: No edema  Left lower leg: No edema  Skin:     General: Skin is warm  Findings: No rash  Neurological:      General: No focal deficit present  Mental Status: She is alert and oriented to person, place, and time  Cranial Nerves: No cranial nerve deficit  Coordination: Coordination normal    Psychiatric:         Mood and Affect: Mood normal          Behavior: Behavior normal          Thought Content:  Thought content normal        Chula Mir MD

## 2023-05-06 NOTE — ASSESSMENT & PLAN NOTE
Levothyroxine 75 mcg daily  Previous thyroid ultrasound in 2020: Heterogenous gland, no dominant nodules

## 2023-05-08 DIAGNOSIS — E03.9 ACQUIRED HYPOTHYROIDISM: Primary | ICD-10-CM

## 2023-06-15 ENCOUNTER — OFFICE VISIT (OUTPATIENT)
Dept: OBGYN CLINIC | Facility: CLINIC | Age: 43
End: 2023-06-15
Payer: COMMERCIAL

## 2023-06-15 VITALS
BODY MASS INDEX: 30.02 KG/M2 | WEIGHT: 186.8 LBS | SYSTOLIC BLOOD PRESSURE: 110 MMHG | HEIGHT: 66 IN | DIASTOLIC BLOOD PRESSURE: 62 MMHG

## 2023-06-15 DIAGNOSIS — Z01.419 ENCOUNTER FOR ANNUAL ROUTINE GYNECOLOGICAL EXAMINATION: Primary | ICD-10-CM

## 2023-06-15 DIAGNOSIS — Z11.51 SCREENING FOR HUMAN PAPILLOMAVIRUS (HPV): ICD-10-CM

## 2023-06-15 DIAGNOSIS — Z00.00 ENCOUNTER FOR WELLNESS EXAMINATION IN ADULT: ICD-10-CM

## 2023-06-15 PROBLEM — M79.609 PAIN IN LIMB: Status: ACTIVE | Noted: 2019-10-17

## 2023-06-15 PROBLEM — M72.2 PLANTAR FASCIAL FIBROMATOSIS: Status: ACTIVE | Noted: 2019-10-17

## 2023-06-15 PROBLEM — M72.2 PLANTAR FASCIAL FIBROMATOSIS: Status: RESOLVED | Noted: 2019-10-17 | Resolved: 2023-06-15

## 2023-06-15 PROBLEM — M79.609 PAIN IN LIMB: Status: RESOLVED | Noted: 2019-10-17 | Resolved: 2023-06-15

## 2023-06-15 PROBLEM — F32.0 CURRENT MILD EPISODE OF MAJOR DEPRESSIVE DISORDER WITHOUT PRIOR EPISODE (HCC): Status: ACTIVE | Noted: 2020-06-26

## 2023-06-15 PROBLEM — E66.812 CLASS 2 OBESITY DUE TO EXCESS CALORIES WITHOUT SERIOUS COMORBIDITY WITH BODY MASS INDEX (BMI) OF 39.0 TO 39.9 IN ADULT: Status: ACTIVE | Noted: 2021-03-12

## 2023-06-15 PROBLEM — E55.9 VITAMIN D DEFICIENCY: Status: ACTIVE | Noted: 2019-12-19

## 2023-06-15 PROBLEM — M77.40 METATARSALGIA: Status: RESOLVED | Noted: 2019-10-17 | Resolved: 2023-06-15

## 2023-06-15 PROBLEM — M77.40 METATARSALGIA: Status: ACTIVE | Noted: 2019-10-17

## 2023-06-15 PROBLEM — F32.0 CURRENT MILD EPISODE OF MAJOR DEPRESSIVE DISORDER WITHOUT PRIOR EPISODE (HCC): Status: RESOLVED | Noted: 2020-06-26 | Resolved: 2023-06-15

## 2023-06-15 PROBLEM — E66.09 CLASS 2 OBESITY DUE TO EXCESS CALORIES WITHOUT SERIOUS COMORBIDITY WITH BODY MASS INDEX (BMI) OF 39.0 TO 39.9 IN ADULT: Status: ACTIVE | Noted: 2021-03-12

## 2023-06-15 PROCEDURE — 99396 PREV VISIT EST AGE 40-64: CPT | Performed by: OBSTETRICS & GYNECOLOGY

## 2023-06-15 PROCEDURE — G0145 SCR C/V CYTO,THINLAYER,RESCR: HCPCS | Performed by: OBSTETRICS & GYNECOLOGY

## 2023-06-15 PROCEDURE — G0476 HPV COMBO ASSAY CA SCREEN: HCPCS | Performed by: OBSTETRICS & GYNECOLOGY

## 2023-06-15 NOTE — PROGRESS NOTES
"Assessment/Plan:    1  Encounter for annual routine gynecological examination    - Liquid-based pap, screening    2  Screening for human papillomavirus (HPV)    - Liquid-based pap, screening        Subjective      Kenneth Wilks is a 38 y o  female who presents for annual exam  She notes some recent UTI's - has been doing longer bike riding  BF recently dx'd with throat CA - HPV type  Patient does not report a h/o abnormal Paps  Current contraception: tubal ligation  History of abnormal Pap smear: no  Regular self breast exam: yes  History of abnormal mammogram: no  History of abnormal lipids: no      Menstrual History:  OB History        2    Para   2    Term                AB        Living   2       SAB        IAB        Ectopic        Multiple        Live Births   2                Patient's last menstrual period was 2023 (exact date)  Period Cycle (Days):  (25-28)  Period Duration (Days): 4  Period Pattern: (!) Irregular  Menstrual Flow: Light, Heavy (Heavy 1-2 days)  Menstrual Control: Maxi pad  Menstrual Control Change Freq (Hours): 4  Dysmenorrhea: (!) Moderate  Dysmenorrhea Symptoms: Cramping    Past Medical History:   Diagnosis Date   • Disease of thyroid gland    • Hypothyroidism 2010   • Varicella     I had this when I was 3 I think  Family History   Problem Relation Age of Onset   • Breast cancer Other    • Pancreatic cancer Paternal Uncle    • Miscarriages / Stillbirths Mother            • Thyroid disease Mother         hypothyroid   • Asthma Brother        The following portions of the patient's history were reviewed and updated as appropriate: allergies, current medications, past family history, past medical history, past social history, past surgical history and problem list       Review of Systems  Pertinent items are noted in HPI        Objective      /62 (BP Location: Right arm, Patient Position: Sitting, Cuff Size: Large)   Ht 5' 5 5\" (1 664 m)   Wt " 84 7 kg (186 lb 12 8 oz)   LMP 05/29/2023 (Exact Date)   BMI 30 61 kg/m²     General:   alert and oriented, in no acute distress   Heart:  Breasts: regular rate and rhythm   appear normal, no suspicious masses, no skin or nipple changes or axillary nodes     Lungs: effort normal   Abdomen: soft, non-tender, without masses or organomegaly   Vulva: normal   Vagina: normal mucosa   Cervix: no lesions   Uterus: normal size, mobile, non-tender   Adnexa: normal adnexa and no mass, fullness, tenderness

## 2023-06-20 LAB
HPV HR 12 DNA CVX QL NAA+PROBE: NEGATIVE
HPV16 DNA CVX QL NAA+PROBE: NEGATIVE
HPV18 DNA CVX QL NAA+PROBE: NEGATIVE

## 2023-06-26 LAB
LAB AP GYN PRIMARY INTERPRETATION: NORMAL
LAB AP LMP: NORMAL
Lab: NORMAL

## 2023-06-27 ENCOUNTER — TELEPHONE (OUTPATIENT)
Dept: PSYCHIATRY | Facility: CLINIC | Age: 43
End: 2023-06-27

## 2023-06-27 NOTE — TELEPHONE ENCOUNTER
Due to no response to the referral [facility]s referral will be closed

## 2023-08-22 ENCOUNTER — HOSPITAL ENCOUNTER (OUTPATIENT)
Dept: MAMMOGRAPHY | Facility: HOSPITAL | Age: 43
Discharge: HOME | End: 2023-08-22
Payer: COMMERCIAL

## 2023-08-22 VITALS — HEIGHT: 66 IN | WEIGHT: 186.73 LBS | BODY MASS INDEX: 30.01 KG/M2

## 2023-08-22 DIAGNOSIS — Z12.31 SCREENING MAMMOGRAM FOR BREAST CANCER: ICD-10-CM

## 2023-08-22 PROCEDURE — 77063 BREAST TOMOSYNTHESIS BI: CPT

## 2023-08-22 PROCEDURE — 77067 SCR MAMMO BI INCL CAD: CPT

## 2023-10-10 ENCOUNTER — HOSPITAL ENCOUNTER (OUTPATIENT)
Dept: ULTRASOUND IMAGING | Facility: CLINIC | Age: 43
Discharge: HOME | End: 2023-10-10
Payer: COMMERCIAL

## 2023-10-10 ENCOUNTER — HOSPITAL ENCOUNTER (OUTPATIENT)
Dept: MAMMOGRAPHY | Facility: CLINIC | Age: 43
Discharge: HOME | End: 2023-10-10
Payer: COMMERCIAL

## 2023-10-10 VITALS — WEIGHT: 187.39 LBS | HEIGHT: 66 IN | BODY MASS INDEX: 30.12 KG/M2

## 2023-10-10 DIAGNOSIS — R92.8 ABNORMAL MAMMOGRAM: ICD-10-CM

## 2023-10-10 PROCEDURE — 77065 DX MAMMO INCL CAD UNI: CPT

## 2023-10-10 PROCEDURE — 76642 ULTRASOUND BREAST LIMITED: CPT

## 2023-10-10 PROCEDURE — G0279 TOMOSYNTHESIS, MAMMO: HCPCS

## 2023-10-11 DIAGNOSIS — Z12.31 ENCOUNTER FOR SCREENING MAMMOGRAM FOR BREAST CANCER: Primary | ICD-10-CM

## 2023-11-28 ENCOUNTER — APPOINTMENT (OUTPATIENT)
Dept: LAB | Facility: CLINIC | Age: 43
End: 2023-11-28
Payer: COMMERCIAL

## 2023-11-28 DIAGNOSIS — E03.9 ACQUIRED HYPOTHYROIDISM: ICD-10-CM

## 2023-11-28 LAB
T4 FREE SERPL-MCNC: 0.93 NG/DL (ref 0.61–1.12)
TSH SERPL DL<=0.05 MIU/L-ACNC: 5.76 UIU/ML (ref 0.45–4.5)

## 2023-11-28 PROCEDURE — 84443 ASSAY THYROID STIM HORMONE: CPT

## 2023-11-28 PROCEDURE — 84439 ASSAY OF FREE THYROXINE: CPT

## 2023-11-28 PROCEDURE — 36415 COLL VENOUS BLD VENIPUNCTURE: CPT

## 2023-12-01 DIAGNOSIS — E03.9 ACQUIRED HYPOTHYROIDISM: Primary | ICD-10-CM

## 2023-12-01 RX ORDER — LEVOTHYROXINE SODIUM 88 UG/1
88 TABLET ORAL
Qty: 90 TABLET | Refills: 1 | Status: SHIPPED | OUTPATIENT
Start: 2023-12-01

## 2023-12-13 ENCOUNTER — OFFICE VISIT (OUTPATIENT)
Dept: FAMILY MEDICINE CLINIC | Facility: CLINIC | Age: 43
End: 2023-12-13
Payer: COMMERCIAL

## 2023-12-13 VITALS
HEART RATE: 82 BPM | DIASTOLIC BLOOD PRESSURE: 64 MMHG | HEIGHT: 66 IN | RESPIRATION RATE: 16 BRPM | TEMPERATURE: 98 F | SYSTOLIC BLOOD PRESSURE: 100 MMHG | OXYGEN SATURATION: 98 % | BODY MASS INDEX: 28.67 KG/M2 | WEIGHT: 178.4 LBS

## 2023-12-13 DIAGNOSIS — E03.9 ACQUIRED HYPOTHYROIDISM: Primary | ICD-10-CM

## 2023-12-13 DIAGNOSIS — E55.9 VITAMIN D DEFICIENCY: ICD-10-CM

## 2023-12-13 PROCEDURE — 99213 OFFICE O/P EST LOW 20 MIN: CPT | Performed by: FAMILY MEDICINE

## 2023-12-13 NOTE — ASSESSMENT & PLAN NOTE
TSH is elevated at 5.7  No unusual fatigue. Dose of levothyroxine was adjusted from 75 to 88 mcg daily. Repeat blood work in 2 to 3 months.

## 2023-12-13 NOTE — PROGRESS NOTES
Name: Kandi Ferguson      : 1980      MRN: [id number]  Encounter Provider: Tamara Painting MD  Encounter Date: 2023   Encounter department: [address] [place] [address]     1. Acquired hypothyroidism  Assessment & Plan:  TSH is elevated at 5.7  No unusual fatigue. Dose of levothyroxine was adjusted from 75 to 88 mcg daily. Repeat blood work in 2 to 3 months. Orders:  -     CBC; Future; Expected date: 2024  -     Comprehensive metabolic panel; Future; Expected date: 2024  -     Lipid Panel with Direct LDL reflex; Future; Expected date: 2024  -     TSH, 3rd generation; Future; Expected date: 2024  -     T4, free; Future; Expected date: 2024    2. Vitamin D deficiency  -     Vitamin D 25 hydroxy; Future; Expected date: 2024      Return in about 1 year (around 2024) for Annual physical/well exam.         Subjective     Follow-up. Results of recent blood work reviewed. TSH is elevated at 5.7. Dose of levothyroxine was increased from 75 to 88 mcg daily. No symptoms of fatigue. Patient has been making an effort to lose weight, so far she is 5 pounds down since May 2023. She had testing for ADHD, no Rx recommended. Patient is UTD with mammo    Colonoscopy  was done a few years ago - possibly at [facility], normal.    Patient had establish care with [address] [place] [address], UTD mammo  and 10/23. Menses are regular. Review of Systems   Constitutional: Negative. HENT: Negative. Respiratory: Negative. Cardiovascular: Negative. Neurological: Negative. Past Medical History:   Diagnosis Date   • Disease of thyroid gland    • Hypothyroidism 2010   • Varicella     I had this when I was 3 I think.      Past Surgical History:   Procedure Laterality Date   • BREAST CYST EXCISION Left     2020- done in [place]- neg   • BREAST SURGERY Left     Bleeding Duct removed   •  SECTION  2001   •  SECTION  2006   • SPINE SURGERY      C6-7 Fushion Aug 2010   • TUBAL LIGATION       Family History   Problem Relation Age of Onset   • Miscarriages / Stillbirths Mother            • Thyroid disease Mother         hypothyroid   • No Known Problems Sister    • No Known Problems Daughter    • Asthma Brother    • No Known Problems Brother    • No Known Problems Son    • No Known Problems Maternal Aunt    • Breast cancer Paternal Aunt 61   • Pancreatic cancer Paternal Uncle    • Breast cancer Other      Social History     Socioeconomic History   • Marital status:      Spouse name: None   • Number of children: None   • Years of education: None   • Highest education level: None   Occupational History   • None   Tobacco Use   • Smoking status: Former     Current packs/day: 1.00     Average packs/day: 1 pack/day for 3.0 years (3.0 ttl pk-yrs)     Types: Cigarettes   • Smokeless tobacco: Never   • Tobacco comments:     I smoked [id number]; and then 5386-8818   Vaping Use   • Vaping status: Never Used   Substance and Sexual Activity   • Alcohol use:  Yes     Alcohol/week: 6.0 standard drinks of alcohol     Types: 6 Cans of beer per week   • Drug use: Never   • Sexual activity: Yes     Partners: Male     Birth control/protection: Female Sterilization     Comment: Tubes tied in    Other Topics Concern   • None   Social History Narrative   • None     Social Determinants of Health     Financial Resource Strain: Not on file   Food Insecurity: Not on file   Transportation Needs: Not on file   Physical Activity: Not on file   Stress: Not on file   Social Connections: Not on file   Intimate Partner Violence: Not on file   Housing Stability: Not on file     Current Outpatient Medications on File Prior to Visit   Medication Sig   • Cholecalciferol 50 MCG (2000 UT) TABS Take 2,000 Units by mouth daily   • levothyroxine 88 mcg tablet Take 1 tablet (88 mcg total) by mouth daily in the early morning     No Known Allergies  Immunization History   Administered Date(s) Administered   • COVID-19 PFIZER VACCINE 0.3 ML IM 05/14/2021, 06/04/2021   • Tdap 03/25/2015       Objective     /64 (BP Location: Left arm, Patient Position: Sitting, Cuff Size: Large)   Pulse 82   Temp 98 °F (36.7 °C) (Temporal)   Resp 16   Ht 5' 5.5" (1.664 m)   Wt 80.9 kg (178 lb 6.4 oz)   LMP 11/28/2023 (Approximate)   SpO2 98%   BMI 29.24 kg/m²     Physical Exam  Constitutional:       Appearance: Normal appearance. HENT:      Head: Normocephalic and atraumatic. Neurological:      General: No focal deficit present. Mental Status: She is alert and oriented to person, place, and time.    Psychiatric:         Mood and Affect: Mood normal.         Behavior: Behavior normal.       Twan Bunch MD

## 2024-01-05 ENCOUNTER — OFFICE VISIT (OUTPATIENT)
Dept: FAMILY MEDICINE CLINIC | Facility: CLINIC | Age: 44
End: 2024-01-05
Payer: COMMERCIAL

## 2024-01-05 VITALS
SYSTOLIC BLOOD PRESSURE: 126 MMHG | RESPIRATION RATE: 18 BRPM | WEIGHT: 182.6 LBS | HEART RATE: 79 BPM | HEIGHT: 65 IN | TEMPERATURE: 98.6 F | BODY MASS INDEX: 30.42 KG/M2 | DIASTOLIC BLOOD PRESSURE: 88 MMHG | OXYGEN SATURATION: 98 %

## 2024-01-05 DIAGNOSIS — J06.9 UPPER RESPIRATORY TRACT INFECTION, UNSPECIFIED TYPE: Primary | ICD-10-CM

## 2024-01-05 PROCEDURE — 99213 OFFICE O/P EST LOW 20 MIN: CPT | Performed by: FAMILY MEDICINE

## 2024-01-05 RX ORDER — ALBUTEROL SULFATE 90 UG/1
AEROSOL, METERED RESPIRATORY (INHALATION)
COMMUNITY
Start: 2023-12-29

## 2024-01-05 RX ORDER — AMOXICILLIN AND CLAVULANATE POTASSIUM 875; 125 MG/1; MG/1
1 TABLET, FILM COATED ORAL EVERY 12 HOURS SCHEDULED
Qty: 20 TABLET | Refills: 0 | Status: SHIPPED | OUTPATIENT
Start: 2024-01-05 | End: 2024-01-15

## 2024-01-05 RX ORDER — DOXYCYCLINE HYCLATE 100 MG/1
CAPSULE ORAL
COMMUNITY
Start: 2023-11-01

## 2024-01-05 RX ORDER — METHYLPREDNISOLONE 4 MG/1
TABLET ORAL
COMMUNITY
Start: 2023-12-29

## 2024-01-05 RX ORDER — BENZONATATE 200 MG/1
200 CAPSULE ORAL 3 TIMES DAILY PRN
Qty: 20 CAPSULE | Refills: 0 | Status: SHIPPED | OUTPATIENT
Start: 2024-01-05

## 2024-01-05 NOTE — PROGRESS NOTES
"Name: Porsche Crow      : 1980      MRN: [id number]  Encounter Provider: Cat Minaya DO  Encounter Date: 2024   Encounter department: [facility]    Assessment & Plan     1. Upper respiratory tract infection, unspecified type  -     benzonatate (TESSALON) 200 MG capsule; Take 1 capsule (200 mg total) by mouth 3 (three) times a day as needed for cough  -     amoxicillin-clavulanate (AUGMENTIN) 875-125 mg per tablet; Take 1 tablet by mouth every 12 (twelve) hours for 10 days         Subjective      HPI  Has been coughing for a couple of months, has not gone away. Turned productive to dry a couple of weeks ago. She was seen at urgent care, told she has bronchitis, was given a steroid and inhaler. Felt better while taking steroid but does not feel like inhaler is helping. Coughs more when she walks up stairs or laughs. Taking decongestant but no cough suppressant or mucolytic.    Review of Systems    Current Outpatient Medications on File Prior to Visit   Medication Sig   • albuterol (PROVENTIL HFA,VENTOLIN HFA) 90 mcg/act inhaler inhale 2 puffs by mouth every 4 to 6 hours if needed for BREATHING   • Cholecalciferol 50 MCG (2000 UT) TABS Take 2,000 Units by mouth daily   • doxycycline hyclate (VIBRAMYCIN) 100 mg capsule    • levothyroxine 88 mcg tablet Take 1 tablet (88 mcg total) by mouth daily in the early morning   • methylPREDNISolone 4 MG tablet therapy pack use as directed FOLLOW DIRECTIONS ON BACK OF FOIL PACK       Objective     /88 (BP Location: Left arm, Patient Position: Sitting, Cuff Size: Standard)   Pulse 79   Temp 98.6 °F (37 °C) (Temporal)   Resp 18   Ht 5' 5\" (1.651 m)   Wt 82.8 kg (182 lb 9.6 oz)   LMP 2023 (Approximate)   SpO2 98%   BMI 30.39 kg/m²     Physical Exam  Vitals reviewed.   Constitutional:       Appearance: Normal appearance.   Cardiovascular:      Rate and Rhythm: Normal rate.      Pulses: Normal pulses.      Heart sounds: Normal " heart sounds.   Pulmonary:      Effort: Pulmonary effort is normal.      Breath sounds: Normal breath sounds.   Abdominal:      General: Abdomen is flat.   Skin:     General: Skin is warm and dry.   Neurological:      Mental Status: She is alert.   Psychiatric:         Mood and Affect: Mood normal.         Behavior: Behavior normal.       Cat Minaya DO

## 2024-02-19 ENCOUNTER — OFFICE VISIT (OUTPATIENT)
Dept: FAMILY MEDICINE CLINIC | Facility: CLINIC | Age: 44
End: 2024-02-19
Payer: COMMERCIAL

## 2024-02-19 VITALS
OXYGEN SATURATION: 99 % | HEIGHT: 65 IN | WEIGHT: 183 LBS | BODY MASS INDEX: 30.49 KG/M2 | TEMPERATURE: 97.8 F | HEART RATE: 100 BPM | DIASTOLIC BLOOD PRESSURE: 80 MMHG | RESPIRATION RATE: 16 BRPM | SYSTOLIC BLOOD PRESSURE: 132 MMHG

## 2024-02-19 DIAGNOSIS — R10.13 DYSPEPSIA: Primary | ICD-10-CM

## 2024-02-19 DIAGNOSIS — Z12.11 COLON CANCER SCREENING: ICD-10-CM

## 2024-02-19 PROCEDURE — 99214 OFFICE O/P EST MOD 30 MIN: CPT | Performed by: FAMILY MEDICINE

## 2024-02-19 NOTE — ASSESSMENT & PLAN NOTE
Patient is concerned about food intolerance (eggs) but actually describes symptoms of bloating, heartburn, postprandial abdominal discomfort.    Differential diagnosis includes food intolerance versus gallbladder disease versus chronic gastritis/GERD versus IBS  Reportedly, patient is up-to-date with colonoscopy which was normal.  The patient will proceed with routine labs including CBC, CMP, lipid.  Panel and TFTs.  I am adding celiac panel.  I recommend to proceed with right upper quadrant ultrasound to rule out gallstones.  Referral to [facility] for further evaluation, patient may benefit from EGD.  We had long discussion regarding bland diet and avoidance of food triggers.  If GI workup is unremarkable-we will consider allergy immunology evaluation as the next diagnostic step.

## 2024-02-19 NOTE — PATIENT INSTRUCTIONS
We will proceed with blood work, GI consult and gallbladder/liver ultrasound  If we do not have answers after this workup is completed-then we will proceed with allergy evaluation

## 2024-02-19 NOTE — PROGRESS NOTES
Name: Porsche Crow      : 1980      MRN: [id number]  Encounter Provider: Fela Garcia MD  Encounter Date: 2024   Encounter department: [facility]    Assessment & Plan     1. Dyspepsia  Assessment & Plan:  Patient is concerned about food intolerance (eggs) but actually describes symptoms of bloating, heartburn, postprandial abdominal discomfort.    Differential diagnosis includes food intolerance versus gallbladder disease versus chronic gastritis/GERD versus IBS  Reportedly, patient is up-to-date with colonoscopy which was normal.  The patient will proceed with routine labs including CBC, CMP, lipid.  Panel and TFTs.  I am adding celiac panel.  I recommend to proceed with right upper quadrant ultrasound to rule out gallstones.  Referral to [facility] for further evaluation, patient may benefit from EGD.  We had long discussion regarding bland diet and avoidance of food triggers.  If GI workup is unremarkable-we will consider allergy immunology evaluation as the next diagnostic step.    Orders:  -     Celiac Disease Antibody Profile; Future  -     US right upper quadrant; Future; Expected date: 2024  -     Ambulatory referral to Gastroenterology; Future    2. Colon cancer screening  Comments:  UTD with colonoscopy      Patient Instructions   We will proceed with blood work, GI consult and gallbladder/liver ultrasound  If we do not have answers after this workup is completed-then we will proceed with allergy evaluation         Subjective     Patient presents to discuss symptoms of dyspepsia.  She has noticed intolerance to eggs/egg containing meals manifesting as gas bloating and heartburn.  Scrambled eggs will cause postprandial pain at times.  Symptoms started approximately 2 years ago.  Family history of celiac disease-mother, patient was tested negative herself approximately 4 years ago.  She also reports intermittent heartburn depending on the diet.  She has  been avoiding peanut butter jelly sandwiches, coffee, bagels, oatmeal with cinnamon and cooked tomatoes, all of those foods will cause increased GERD symptoms.  Patient denies abdominal pain per se.  No nausea vomiting.  Bowel movements are regular.  No family history of gallstones but her mother has fatty liver.  Patient is also concerned about possible peanut allergy.    Patient is up-to-date with colonoscopy, she had at outside the network and is going to bring us records.      Review of Systems   Constitutional: Negative.    HENT: Negative.     Respiratory: Negative.     Cardiovascular: Negative.    Gastrointestinal:         As per HPI   Genitourinary: Negative.    Neurological: Negative.    Psychiatric/Behavioral: Negative.         Past Medical History:   Diagnosis Date   • Anxiety     Diagnosed last November in counseling   • Disease of thyroid gland    • Hypothyroidism 2010   • Varicella     I had this when I was 3 I think.     Past Surgical History:   Procedure Laterality Date   • BREAST CYST EXCISION Left     2020- done in [place]- neg   • BREAST SURGERY Left     Bleeding Duct removed   •  SECTION  2001   •  SECTION  2006   • SPINE SURGERY      C6-7 Fushion Aug 2010   • TUBAL LIGATION       Family History   Problem Relation Age of Onset   • Miscarriages / Stillbirths Mother            • Thyroid disease Mother         hypothyroid   • No Known Problems Sister    • No Known Problems Daughter    • Asthma Brother    • No Known Problems Brother    • No Known Problems Son    • No Known Problems Maternal Aunt    • Breast cancer Paternal Aunt 60   • Pancreatic cancer Paternal Uncle    • Breast cancer Other    • Bipolar disorder Brother      Social History     Socioeconomic History   • Marital status:      Spouse name: None   • Number of children: None   • Years of education: None   • Highest education level: None   Occupational History   • None   Tobacco Use   •  "Smoking status: Former     Current packs/day: 1.00     Average packs/day: 1 pack/day for 3.0 years (3.0 ttl pk-yrs)     Types: Cigarettes   • Smokeless tobacco: Never   • Tobacco comments:     I smoked [id number]; and then 7954-4500   Vaping Use   • Vaping status: Never Used   Substance and Sexual Activity   • Alcohol use: Yes     Alcohol/week: 3.0 standard drinks of alcohol     Types: 3 Cans of beer per week   • Drug use: Never   • Sexual activity: Yes     Partners: Male     Birth control/protection: Female Sterilization     Comment: Tubes tied in 2006   Other Topics Concern   • None   Social History Narrative   • None     Social Determinants of Health     Financial Resource Strain: Not on file   Food Insecurity: Not on file   Transportation Needs: Not on file   Physical Activity: Not on file   Stress: Not on file   Social Connections: Not on file   Intimate Partner Violence: Not on file   Housing Stability: Not on file     Current Outpatient Medications on File Prior to Visit   Medication Sig   • Cholecalciferol 50 MCG (2000 UT) TABS Take 2,000 Units by mouth daily   • levothyroxine 88 mcg tablet Take 1 tablet (88 mcg total) by mouth daily in the early morning     No Known Allergies  Immunization History   Administered Date(s) Administered   • COVID-19 PFIZER VACCINE 0.3 ML IM 05/14/2021, 06/04/2021   • Tdap 03/25/2015       Objective     /80 (BP Location: Left arm, Patient Position: Sitting, Cuff Size: Standard)   Pulse 100   Temp 97.8 °F (36.6 °C) (Temporal)   Resp 16   Ht 5' 5\" (1.651 m)   Wt 83 kg (183 lb)   SpO2 99%   BMI 30.45 kg/m²     Physical Exam  Vitals and nursing note reviewed.   Constitutional:       General: She is not in acute distress.     Appearance: Normal appearance. She is not ill-appearing.   Cardiovascular:      Heart sounds: Normal heart sounds. No murmur heard.  Pulmonary:      Effort: No respiratory distress.      Breath sounds: Normal breath sounds.   Abdominal:      General: " There is no distension.      Palpations: Abdomen is soft.      Tenderness: There is no abdominal tenderness.      Hernia: No hernia is present.   Neurological:      General: No focal deficit present.      Mental Status: She is alert and oriented to person, place, and time.   Psychiatric:         Mood and Affect: Mood normal.         Behavior: Behavior normal.         Thought Content: Thought content normal.       Fela Garcia MD

## 2024-02-20 ENCOUNTER — APPOINTMENT (OUTPATIENT)
Dept: LAB | Facility: CLINIC | Age: 44
End: 2024-02-20
Payer: COMMERCIAL

## 2024-02-20 DIAGNOSIS — E03.9 ACQUIRED HYPOTHYROIDISM: ICD-10-CM

## 2024-02-20 DIAGNOSIS — R10.13 DYSPEPSIA: ICD-10-CM

## 2024-02-20 DIAGNOSIS — E55.9 VITAMIN D DEFICIENCY: ICD-10-CM

## 2024-02-20 LAB
25(OH)D3 SERPL-MCNC: 32.9 NG/ML (ref 30–100)
ALBUMIN SERPL BCP-MCNC: 4.2 G/DL (ref 3.5–5)
ALP SERPL-CCNC: 46 U/L (ref 34–104)
ALT SERPL W P-5'-P-CCNC: 12 U/L (ref 7–52)
ANION GAP SERPL CALCULATED.3IONS-SCNC: 8 MMOL/L
AST SERPL W P-5'-P-CCNC: 14 U/L (ref 13–39)
BILIRUB SERPL-MCNC: 0.83 MG/DL (ref 0.2–1)
BUN SERPL-MCNC: 14 MG/DL (ref 5–25)
CALCIUM SERPL-MCNC: 9.4 MG/DL (ref 8.4–10.2)
CHLORIDE SERPL-SCNC: 104 MMOL/L (ref 96–108)
CHOLEST SERPL-MCNC: 149 MG/DL
CO2 SERPL-SCNC: 28 MMOL/L (ref 21–32)
CREAT SERPL-MCNC: 0.72 MG/DL (ref 0.6–1.3)
ERYTHROCYTE [DISTWIDTH] IN BLOOD BY AUTOMATED COUNT: 12.7 % (ref 11.6–15.1)
GFR SERPL CREATININE-BSD FRML MDRD: 101 ML/MIN/1.73SQ M
GLUCOSE P FAST SERPL-MCNC: 92 MG/DL (ref 65–99)
HCT VFR BLD AUTO: 40.3 % (ref 34.8–46.1)
HDLC SERPL-MCNC: 57 MG/DL
HGB BLD-MCNC: 13.1 G/DL (ref 11.5–15.4)
LDLC SERPL CALC-MCNC: 74 MG/DL (ref 0–100)
MCH RBC QN AUTO: 31 PG (ref 26.8–34.3)
MCHC RBC AUTO-ENTMCNC: 32.5 G/DL (ref 31.4–37.4)
MCV RBC AUTO: 95 FL (ref 82–98)
PLATELET # BLD AUTO: 228 THOUSANDS/UL (ref 149–390)
PMV BLD AUTO: 10.5 FL (ref 8.9–12.7)
POTASSIUM SERPL-SCNC: 4 MMOL/L (ref 3.5–5.3)
PROT SERPL-MCNC: 7 G/DL (ref 6.4–8.4)
RBC # BLD AUTO: 4.23 MILLION/UL (ref 3.81–5.12)
SODIUM SERPL-SCNC: 140 MMOL/L (ref 135–147)
T4 FREE SERPL-MCNC: 0.9 NG/DL (ref 0.61–1.12)
TRIGL SERPL-MCNC: 90 MG/DL
TSH SERPL DL<=0.05 MIU/L-ACNC: 4.14 UIU/ML (ref 0.45–4.5)
WBC # BLD AUTO: 5.31 THOUSAND/UL (ref 4.31–10.16)

## 2024-02-20 PROCEDURE — 85027 COMPLETE CBC AUTOMATED: CPT

## 2024-02-20 PROCEDURE — 80053 COMPREHEN METABOLIC PANEL: CPT

## 2024-02-20 PROCEDURE — 36415 COLL VENOUS BLD VENIPUNCTURE: CPT

## 2024-02-20 PROCEDURE — 82784 ASSAY IGA/IGD/IGG/IGM EACH: CPT

## 2024-02-20 PROCEDURE — 86231 EMA EACH IG CLASS: CPT

## 2024-02-20 PROCEDURE — 86364 TISS TRNSGLTMNASE EA IG CLAS: CPT

## 2024-02-20 PROCEDURE — 82306 VITAMIN D 25 HYDROXY: CPT

## 2024-02-20 PROCEDURE — 84443 ASSAY THYROID STIM HORMONE: CPT

## 2024-02-20 PROCEDURE — 80061 LIPID PANEL: CPT

## 2024-02-20 PROCEDURE — 86258 DGP ANTIBODY EACH IG CLASS: CPT

## 2024-02-20 PROCEDURE — 84439 ASSAY OF FREE THYROXINE: CPT

## 2024-02-21 LAB
ENDOMYSIUM IGA SER QL: NEGATIVE
GLIADIN PEPTIDE IGA SER-ACNC: 9 UNITS (ref 0–19)
GLIADIN PEPTIDE IGG SER-ACNC: 9 UNITS (ref 0–19)
IGA SERPL-MCNC: 259 MG/DL (ref 87–352)
TTG IGA SER-ACNC: <2 U/ML (ref 0–3)
TTG IGG SER-ACNC: 5 U/ML (ref 0–5)

## 2024-02-29 ENCOUNTER — HOSPITAL ENCOUNTER (OUTPATIENT)
Dept: ULTRASOUND IMAGING | Facility: HOSPITAL | Age: 44
Discharge: HOME | End: 2024-02-29
Payer: COMMERCIAL

## 2024-02-29 DIAGNOSIS — R10.13 DYSPEPSIA: ICD-10-CM

## 2024-02-29 PROCEDURE — 76705 ECHO EXAM OF ABDOMEN: CPT

## 2024-03-19 ENCOUNTER — TELEPHONE (OUTPATIENT)
Dept: GASTROENTEROLOGY | Facility: CLINIC | Age: 44
End: 2024-03-19

## 2024-03-19 ENCOUNTER — OFFICE VISIT (OUTPATIENT)
Dept: GASTROENTEROLOGY | Facility: CLINIC | Age: 44
End: 2024-03-19
Payer: COMMERCIAL

## 2024-03-19 VITALS
HEIGHT: 65 IN | SYSTOLIC BLOOD PRESSURE: 116 MMHG | BODY MASS INDEX: 30.49 KG/M2 | WEIGHT: 183 LBS | TEMPERATURE: 98.9 F | DIASTOLIC BLOOD PRESSURE: 60 MMHG

## 2024-03-19 DIAGNOSIS — E73.9 LACTOSE INTOLERANCE: ICD-10-CM

## 2024-03-19 DIAGNOSIS — R10.13 DYSPEPSIA: Primary | ICD-10-CM

## 2024-03-19 DIAGNOSIS — K21.9 GASTROESOPHAGEAL REFLUX DISEASE WITHOUT ESOPHAGITIS: ICD-10-CM

## 2024-03-19 PROCEDURE — 99204 OFFICE O/P NEW MOD 45 MIN: CPT | Performed by: INTERNAL MEDICINE

## 2024-03-19 NOTE — PATIENT INSTRUCTIONS
Scheduled date of EGD(as of today):04.25.24  Physician performing EGD:DR DU  Location of EGD:[facility]  Instructions reviewed with patient by:LG  Clearances: N/A

## 2024-03-19 NOTE — PROGRESS NOTES
[facility] - Outpatient Consultation  Porsche Crow 43 y.o. female MRN: [id number]  Encounter: [id number]          ASSESSMENT AND PLAN:      1. Dyspepsia  - Ambulatory referral to Gastroenterology  - EGD; Future    2. Gastroesophageal reflux disease without esophagitis  3. Lactose intolerance  4. Family history of celiac disease   5.  Colorectal cancer screening-2021 with repeat recommended in 2031  - low FODMAP diet   - dietary changed for GERD   - EGD planned on EGD will plan to rule out H. pylori, celiac disease and eosinophilic esophagitis  In the future we can try a trial of PPIs and SIBO testing if above does not help improve symptoms    ______________________________________________________________________    HPI:        She is 43-year-old female presents here for evaluation for dyspepsia, GERD, history of lactose intolerance based on history, family history of celiac disease.  She is up-to-date on colorectal cancer screening as this was evaluated at an outside facility in 2021 which was within normal limits.  Repeat colonoscopy at that time was recommended in 2031.      She has had reflux for over 10 years which has been intermittent nature.  She has not been previously evaluated for this in the past.  She also does have history of dyspepsia associated lactose intolerance.  Lactose intolerance has not ever been officially tested for but this is based on history as consumption of dairy has resulted in abdominal bloating and discomfort.  She has been trying to avoid dairy and has stopped drinking milk but still continues to have other dairy products.  She usually has abdominal discomfort associate with scrambled eggs but has not tried eggs without dairy.    She does have family history of celiac disease.  Recent studies serology were negative.  Her mother was diagnosed with celiac disease in the past.  She has not undergone endoscopic evaluation biopsies either.          Answers  submitted by the patient for this visit:  Abdominal Pain Questionnaire (Submitted on 3/15/2024)  Chief Complaint: Abdominal pain  Chronicity: chronic  Onset: more than 1 year ago  Onset quality: sudden  Frequency: intermittently  Episode duration: 1 Hours  Progression since onset: unchanged  Pain location: epigastric region  Pain - numeric: 7/10  Pain quality: sharp  Radiates to: does not radiate  anorexia: No  arthralgias: No  belching: Yes  constipation: No  diarrhea: No  dysuria: No  fever: No  flatus: Yes  frequency: No  headaches: No  hematochezia: No  hematuria: No  melena: No  myalgias: No  nausea: No  weight loss: No  vomiting: No  Aggravated by: movement  Relieved by: being still  Diagnostic workup: ultrasound    REVIEW OF SYSTEMS:    CONSTITUTIONAL: Denies any fever, chills, rigors, and weight loss.  HEENT: No earache or tinnitus. Denies hearing loss or visual disturbances.  CARDIOVASCULAR: No chest pain or palpitations.   RESPIRATORY: Denies any cough, hemoptysis, shortness of breath or dyspnea on exertion.  GASTROINTESTINAL: As noted in the History of Present Illness.   GENITOURINARY: No problems with urination. Denies any hematuria or dysuria.  NEUROLOGIC: No dizziness or vertigo, denies headaches.   MUSCULOSKELETAL: Denies any muscle or joint pain.   SKIN: Denies skin rashes or itching.   ENDOCRINE: Denies excessive thirst. Denies intolerance to heat or cold.  PSYCHOSOCIAL: Denies depression or anxiety. Denies any recent memory loss.       Historical Information   Past Medical History:   Diagnosis Date    Anxiety     Diagnosed last November in counseling    Disease of thyroid gland     GERD (gastroesophageal reflux disease)     I've had this issue off and on for most of my adult life.    Hypothyroidism 6/2010    Varicella     I had this when I was 3 I think.     Past Surgical History:   Procedure Laterality Date    BREAST CYST EXCISION Left     12/2020- done in [place]- neg    BREAST SURGERY Left 2020  "   Bleeding Duct removed     SECTION  2001     SECTION  2006    COLONOSCOPY  2021    SPINE SURGERY      C6-7 Fushion Aug 2010    TUBAL LIGATION       Social History   Social History     Substance and Sexual Activity   Alcohol Use Yes    Alcohol/week: 6.0 standard drinks of alcohol    Types: 6 Cans of beer per week     Social History     Substance and Sexual Activity   Drug Use Never     Social History     Tobacco Use   Smoking Status Former    Current packs/day: 1.00    Average packs/day: 1 pack/day for 3.0 years (3.0 ttl pk-yrs)    Types: Cigarettes   Smokeless Tobacco Never   Tobacco Comments    I smoked [id number]; and then 7162-3474     Family History   Problem Relation Age of Onset    Miscarriages / Stillbirths Mother         1979    Thyroid disease Mother         hypothyroid    Celiac disease Mother     Hypothyroidism Mother     Liver disease Mother         Dx with fatty live 2024    COPD Father     No Known Problems Sister     No Known Problems Daughter     Asthma Brother     Addiction problem Brother         In rehab for drug use    No Known Problems Brother     No Known Problems Son     No Known Problems Maternal Aunt     Breast cancer Paternal Aunt 60    Pancreatic cancer Paternal Uncle     Breast cancer Other     Bipolar disorder Brother     Addiction problem Brother         In rehab for alcoholism       Meds/Allergies       Current Outpatient Medications:     Cholecalciferol 50 MCG (2000 UT) TABS    levothyroxine 88 mcg tablet    No Known Allergies        Objective     Blood pressure 116/60, temperature 98.9 °F (37.2 °C), temperature source Tympanic, height 5' 5\" (1.651 m), weight 83 kg (183 lb). Body mass index is 30.45 kg/m².        PHYSICAL EXAM:      General Appearance:   Alert, cooperative, no distress   HEENT:   Normocephalic, atraumatic, anicteric.     Neck:  Supple, symmetrical, trachea midline   Lungs:   Clear to auscultation bilaterally; no rales, rhonchi or " wheezing; respirations unlabored    Heart::   Regular rate and rhythm; no murmur, rub, or gallop.   Abdomen:   Soft, non-tender, non-distended; normal bowel sounds; no masses, no organomegaly    Genitalia:   Deferred    Rectal:   Deferred    Extremities:  No cyanosis, clubbing or edema    Pulses:  2+ and symmetric    Skin:  No jaundice, rashes, or lesions    Lymph nodes:  No palpable cervical lymphadenopathy        Lab Results:   No visits with results within 1 Day(s) from this visit.   Latest known visit with results is:   Appointment on 02/20/2024   Component Date Value    WBC 02/20/2024 5.31     RBC 02/20/2024 4.23     Hemoglobin 02/20/2024 13.1     Hematocrit 02/20/2024 40.3     MCV 02/20/2024 95     MCH 02/20/2024 31.0     MCHC 02/20/2024 32.5     RDW 02/20/2024 12.7     Platelets 02/20/2024 228     MPV 02/20/2024 10.5     Sodium 02/20/2024 140     Potassium 02/20/2024 4.0     Chloride 02/20/2024 104     CO2 02/20/2024 28     ANION GAP 02/20/2024 8     BUN 02/20/2024 14     Creatinine 02/20/2024 0.72     Glucose, Fasting 02/20/2024 92     Calcium 02/20/2024 9.4     AST 02/20/2024 14     ALT 02/20/2024 12     Alkaline Phosphatase 02/20/2024 46     Total Protein 02/20/2024 7.0     Albumin 02/20/2024 4.2     Total Bilirubin 02/20/2024 0.83     eGFR 02/20/2024 101     Cholesterol 02/20/2024 149     Triglycerides 02/20/2024 90     HDL, Direct 02/20/2024 57     LDL Calculated 02/20/2024 74     TSH 3RD GENERATON 02/20/2024 4.141     Free T4 02/20/2024 0.90     Vit D, 25-Hydroxy 02/20/2024 32.9     IgA 02/20/2024 259     Gliadin IgA 02/20/2024 9     Gliadin IgG 02/20/2024 9     Tissue Transglut Ab IGG 02/20/2024 5     TISSUE TRANSGLUTAMINASE * 02/20/2024 <2     Endomysial IgA 02/20/2024 Negative          Radiology Results:   US right upper quadrant    Result Date: 3/5/2024  Narrative: RIGHT UPPER QUADRANT ULTRASOUND INDICATION: R10.13: Epigastric pain. COMPARISON: None TECHNIQUE: Real-time ultrasound of the right upper  quadrant was performed with a curvilinear transducer with both volumetric sweeps and still imaging techniques. FINDINGS: PANCREAS: Visualized portions of the pancreas are within normal limits. AORTA AND IVC: Visualized portions are normal for patient age. LIVER: Size: Within normal range. The liver measures 13.6 cm in the midclavicular line. Contour: Surface contour is smooth. Parenchyma: Echogenicity and echotexture are within normal limits. No liver mass identified. Limited imaging of the main portal vein shows it to be patent and hepatopetal. BILIARY: No gallbladder findings. No intrahepatic biliary dilatation. CBD measures 3.0 mm. No choledocholithiasis. KIDNEY: Right kidney measures 9.4 x 4.0 x 4.7 cm. Volume 91.9 mL Kidney within normal limits. ASCITES: None.     Impression: Normal right upper quadrant ultrasound Workstation performed: [id number]

## 2024-04-11 ENCOUNTER — ANESTHESIA EVENT (OUTPATIENT)
Dept: ANESTHESIOLOGY | Facility: HOSPITAL | Age: 44
End: 2024-04-11

## 2024-04-11 ENCOUNTER — ANESTHESIA (OUTPATIENT)
Dept: ANESTHESIOLOGY | Facility: HOSPITAL | Age: 44
End: 2024-04-11

## 2024-04-11 ENCOUNTER — PATIENT MESSAGE (OUTPATIENT)
Dept: GASTROENTEROLOGY | Facility: CLINIC | Age: 44
End: 2024-04-11

## 2024-04-23 ENCOUNTER — TELEPHONE (OUTPATIENT)
Dept: GASTROENTEROLOGY | Facility: MEDICAL CENTER | Age: 44
End: 2024-04-23

## 2024-04-24 RX ORDER — SODIUM CHLORIDE 9 MG/ML
125 INJECTION, SOLUTION INTRAVENOUS CONTINUOUS
Status: CANCELLED | OUTPATIENT
Start: 2024-04-24

## 2024-04-24 RX ORDER — ONDANSETRON 2 MG/ML
4 INJECTION INTRAMUSCULAR; INTRAVENOUS ONCE AS NEEDED
Status: CANCELLED | OUTPATIENT
Start: 2024-04-24

## 2024-04-24 RX ORDER — ALBUTEROL SULFATE 2.5 MG/3ML
2.5 SOLUTION RESPIRATORY (INHALATION) ONCE AS NEEDED
Status: CANCELLED | OUTPATIENT
Start: 2024-04-24

## 2024-04-25 ENCOUNTER — HOSPITAL ENCOUNTER (OUTPATIENT)
Dept: GASTROENTEROLOGY | Facility: MEDICAL CENTER | Age: 44
Setting detail: OUTPATIENT SURGERY
End: 2024-04-25
Attending: INTERNAL MEDICINE
Payer: COMMERCIAL

## 2024-04-25 ENCOUNTER — ANESTHESIA EVENT (OUTPATIENT)
Dept: GASTROENTEROLOGY | Facility: MEDICAL CENTER | Age: 44
End: 2024-04-25

## 2024-04-25 ENCOUNTER — ANESTHESIA (OUTPATIENT)
Dept: GASTROENTEROLOGY | Facility: MEDICAL CENTER | Age: 44
End: 2024-04-25

## 2024-04-25 VITALS
TEMPERATURE: 97.7 F | SYSTOLIC BLOOD PRESSURE: 102 MMHG | WEIGHT: 182 LBS | OXYGEN SATURATION: 99 % | DIASTOLIC BLOOD PRESSURE: 70 MMHG | BODY MASS INDEX: 29.25 KG/M2 | HEART RATE: 71 BPM | RESPIRATION RATE: 16 BRPM | HEIGHT: 66 IN

## 2024-04-25 DIAGNOSIS — R10.13 DYSPEPSIA: ICD-10-CM

## 2024-04-25 LAB
EXT PREGNANCY TEST URINE: NEGATIVE
EXT. CONTROL: NORMAL

## 2024-04-25 PROCEDURE — 43239 EGD BIOPSY SINGLE/MULTIPLE: CPT | Performed by: INTERNAL MEDICINE

## 2024-04-25 PROCEDURE — 81025 URINE PREGNANCY TEST: CPT | Performed by: ANESTHESIOLOGY

## 2024-04-25 PROCEDURE — 88305 TISSUE EXAM BY PATHOLOGIST: CPT | Performed by: PATHOLOGY

## 2024-04-25 RX ORDER — LIDOCAINE HYDROCHLORIDE 20 MG/ML
INJECTION, SOLUTION EPIDURAL; INFILTRATION; INTRACAUDAL; PERINEURAL AS NEEDED
Status: DISCONTINUED | OUTPATIENT
Start: 2024-04-25 | End: 2024-04-25

## 2024-04-25 RX ORDER — ONDANSETRON 2 MG/ML
4 INJECTION INTRAMUSCULAR; INTRAVENOUS ONCE AS NEEDED
Status: DISCONTINUED | OUTPATIENT
Start: 2024-04-25 | End: 2024-04-29 | Stop reason: HOSPADM

## 2024-04-25 RX ORDER — PROPOFOL 10 MG/ML
INJECTION, EMULSION INTRAVENOUS AS NEEDED
Status: DISCONTINUED | OUTPATIENT
Start: 2024-04-25 | End: 2024-04-25

## 2024-04-25 RX ORDER — SODIUM CHLORIDE 9 MG/ML
125 INJECTION, SOLUTION INTRAVENOUS CONTINUOUS
Status: DISCONTINUED | OUTPATIENT
Start: 2024-04-25 | End: 2024-04-29 | Stop reason: HOSPADM

## 2024-04-25 RX ORDER — ALBUTEROL SULFATE 2.5 MG/3ML
2.5 SOLUTION RESPIRATORY (INHALATION) ONCE AS NEEDED
Status: DISCONTINUED | OUTPATIENT
Start: 2024-04-25 | End: 2024-04-29 | Stop reason: HOSPADM

## 2024-04-25 RX ADMIN — PROPOFOL 150 MG: 10 INJECTION, EMULSION INTRAVENOUS at 15:26

## 2024-04-25 RX ADMIN — SODIUM CHLORIDE: 0.9 INJECTION, SOLUTION INTRAVENOUS at 15:22

## 2024-04-25 RX ADMIN — LIDOCAINE HYDROCHLORIDE 5 ML: 20 INJECTION, SOLUTION EPIDURAL; INFILTRATION; INTRACAUDAL at 15:26

## 2024-04-25 NOTE — ANESTHESIA POSTPROCEDURE EVALUATION
Post-Op Assessment Note    CV Status:  Stable    Pain management: adequate       Mental Status:  Alert and awake   Hydration Status:  Euvolemic   PONV Controlled:  Controlled   Airway Patency:  Patent     Post Op Vitals Reviewed: Yes    No anethesia notable event occurred.                /70 (04/25/24 1550)    Temp      Pulse 71 (04/25/24 1550)   Resp 16 (04/25/24 1550)    SpO2 99 % (04/25/24 1550)

## 2024-04-25 NOTE — H&P
"History and Physical -  [facility]  Porsche Crow 43 y.o. female MRN: [id number]    HPI: Porsche Crow is a 43 y.o. year old female who presents for EGD.     Last Hg   Lab Results   Component Value Date    HGB 13.1 2024     Last INR No results found for: \"INR\"  Last Platelets   Lab Results   Component Value Date     2024       Review of Systems    Historical Information   Past Medical History:   Diagnosis Date    Anxiety     Diagnosed last November in counseling    Disease of thyroid gland     GERD (gastroesophageal reflux disease)     I've had this issue off and on for most of my adult life.    Hypothyroidism 2010    Varicella     I had this when I was 3 I think.     Past Surgical History:   Procedure Laterality Date    BREAST CYST EXCISION Left     2020- done in [place]- neg    BREAST SURGERY Left     Bleeding Duct removed     SECTION  2001     SECTION  2006    COLONOSCOPY  2021    SPINE SURGERY      C6-7 Fushion Aug 2010    TUBAL LIGATION       Social History   Social History     Substance and Sexual Activity   Alcohol Use Yes    Alcohol/week: 6.0 standard drinks of alcohol    Types: 6 Cans of beer per week    Comment: occasionally     Social History     Substance and Sexual Activity   Drug Use Never     Social History     Tobacco Use   Smoking Status Former    Current packs/day: 1.00    Average packs/day: 1 pack/day for 3.0 years (3.0 ttl pk-yrs)    Types: Cigarettes   Smokeless Tobacco Never   Tobacco Comments    I smoked 0988-6711; and then 2462-3023     Family History   Problem Relation Age of Onset    Miscarriages / Stillbirths Mother         1979    Thyroid disease Mother         hypothyroid    Celiac disease Mother     Hypothyroidism Mother     Liver disease Mother         Dx with fatty live 2024    COPD Father     No Known Problems Sister     No Known Problems Daughter     Asthma Brother     Addiction problem Brother      " "   In rehab for drug use    No Known Problems Brother     No Known Problems Son     No Known Problems Maternal Aunt     Breast cancer Paternal Aunt 60    Pancreatic cancer Paternal Uncle     Breast cancer Other     Bipolar disorder Brother     Addiction problem Brother         In rehab for alcoholism       Meds/Allergies     (Not in a hospital admission)      No Known Allergies    Objective     /72   Pulse 68   Temp 97.7 °F (36.5 °C) (Temporal)   Resp 18   Ht 5' 5.5\" (1.664 m)   Wt 82.6 kg (182 lb)   SpO2 100%   BMI 29.83 kg/m²     PRIOR GI PROCEDURES      PHYSICAL EXAM    Gen: NAD  CV: RRR  CHEST: Clear  ABD: soft, NT/ND  EXT: no edema  Neuro: AAO    ASSESSMENT/PLAN:  This is a 43 y.o. year old female here for EGD    Plan/Procedure: EGD       "

## 2024-04-25 NOTE — ANESTHESIA PREPROCEDURE EVALUATION
Procedure:  EGD    Relevant Problems   ENDO   (+) Acquired hypothyroidism      GI/HEPATIC   (+) Gastroesophageal reflux disease without esophagitis        Physical Exam    Airway    Mallampati score: II         Dental   No notable dental hx     Cardiovascular  Cardiovascular exam normal    Pulmonary  Pulmonary exam normal     Other Findings  post-pubertal.      Anesthesia Plan  ASA Score- 2     Anesthesia Type- IV sedation with anesthesia with ASA Monitors.         Additional Monitors:     Airway Plan:            Plan Factors-Exercise tolerance (METS): >4 METS.    Chart reviewed.        Patient is not a current smoker.  Patient instructed to abstain from smoking on day of procedure. Patient did not smoke on day of surgery.            Induction- intravenous.    Postoperative Plan-     Informed Consent- Anesthetic plan and risks discussed with patient.

## 2024-04-29 PROCEDURE — 88305 TISSUE EXAM BY PATHOLOGIST: CPT | Performed by: PATHOLOGY

## 2024-05-25 DIAGNOSIS — E03.9 ACQUIRED HYPOTHYROIDISM: ICD-10-CM

## 2024-05-26 RX ORDER — LEVOTHYROXINE SODIUM 88 UG/1
88 TABLET ORAL
Qty: 90 TABLET | Refills: 1 | Status: SHIPPED | OUTPATIENT
Start: 2024-05-26

## 2024-06-15 DIAGNOSIS — Z00.6 ENCOUNTER FOR EXAMINATION FOR NORMAL COMPARISON OR CONTROL IN CLINICAL RESEARCH PROGRAM: ICD-10-CM

## 2024-06-20 ENCOUNTER — OFFICE VISIT (OUTPATIENT)
Dept: GASTROENTEROLOGY | Facility: CLINIC | Age: 44
End: 2024-06-20
Payer: COMMERCIAL

## 2024-06-20 VITALS
TEMPERATURE: 98.8 F | SYSTOLIC BLOOD PRESSURE: 110 MMHG | HEIGHT: 66 IN | WEIGHT: 181.4 LBS | BODY MASS INDEX: 29.15 KG/M2 | DIASTOLIC BLOOD PRESSURE: 72 MMHG

## 2024-06-20 DIAGNOSIS — E73.9 LACTOSE INTOLERANCE: ICD-10-CM

## 2024-06-20 DIAGNOSIS — K21.9 GASTROESOPHAGEAL REFLUX DISEASE WITHOUT ESOPHAGITIS: Primary | ICD-10-CM

## 2024-06-20 DIAGNOSIS — K29.80 PEPTIC DUODENITIS: ICD-10-CM

## 2024-06-20 DIAGNOSIS — Z12.11 SCREENING FOR COLON CANCER: ICD-10-CM

## 2024-06-20 PROCEDURE — 99213 OFFICE O/P EST LOW 20 MIN: CPT | Performed by: PHYSICIAN ASSISTANT

## 2024-06-20 NOTE — PROGRESS NOTES
[facility] - Outpatient Follow-up Note  Porsche Crow 43 y.o. female MRN: [id number]  Encounter: [id number]          ASSESSMENT AND PLAN:      1. Gastroesophageal reflux disease without esophagitis  Controlled with diet and lifestyle changes. Recent EGD showed normal esophagus and biopsies were negative for eosinophilic esophagitis. Discussed benefits of weight loss.  She is not interested in starting medication at this time.    2. Lactose intolerance  Continue to avoid dairy products, since this seems to cause constipation    - Ambulatory Referral to Nutrition Services; Future    3. BMI 29.0-29.9,adult  Discussed that she would benefit from weight loss. Patient requesting referral to nutrition. I did explain a nutrition consult likely will not be covered by insurance, but she can pay out-of-pocket.    - Ambulatory Referral to Nutrition Services; Future    4. Screening for colon cancer  She had normal colonoscopy in 2021 with repeat recommended in 2031.    5. Peptic duodenitis  Identified on recent duodenal biopsies. Patient has reduced use of NSAIDs.    Follow-up as needed  ______________________________________________________________________    SUBJECTIVE: 43-year-old female with history of GERD, dyspepsia, lactose intolerance presenting for follow-up after EGD.    She is currently doing well.  She has since switched from Advil to Tylenol for pain control as needed.  She has been avoiding reflux trigger foods like sugary foods and greasy foods.  She gets heartburn a few days per week, but she is managing this with diet.  She is also avoiding lactose because this tends to cause constipation.  She has been eating high-fiber foods and also drinking at least 60 ounces of water daily.  No reports of blood in the stool.  She would like to lose weight, so she is interested in seeing a nutritionist.    Answers submitted by the patient for this visit:  Abdominal Pain Questionnaire  (Submitted on 2024)  Chief Complaint: Abdominal pain  Chronicity: chronic  Onset: more than 1 year ago  Onset quality: sudden  Frequency: intermittently  Episode duration: 2 Hours  Progression since onset: unchanged  Pain - numeric: 8/10  Pain quality: sharp  Radiates to: does not radiate  anorexia: No  arthralgias: Yes  belching: Yes  constipation: No  diarrhea: No  dysuria: No  fever: No  flatus: No  frequency: No  headaches: No  hematochezia: No  hematuria: No  melena: No  myalgias: No  nausea: No  weight loss: No  vomiting: No  Aggravated by: nothing  Relieved by: nothing  Diagnostic workup: upper endoscopy        REVIEW OF SYSTEMS IS OTHERWISE NEGATIVE.      Historical Information   Past Medical History:   Diagnosis Date    Anxiety     Diagnosed last November in counseling    Disease of thyroid gland     GERD (gastroesophageal reflux disease)     I've had this issue off and on for most of my adult life.    Hypothyroidism 2010    Varicella     I had this when I was 3 I think.     Past Surgical History:   Procedure Laterality Date    BREAST CYST EXCISION Left     2020- done in [place]- neg    BREAST SURGERY Left     Bleeding Duct removed     SECTION  2001     SECTION  2006    COLONOSCOPY  2021    SPINE SURGERY      C6-7 Fushion Aug 2010    TUBAL LIGATION       Social History   Social History     Substance and Sexual Activity   Alcohol Use Yes    Alcohol/week: 6.0 standard drinks of alcohol    Types: 6 Cans of beer per week    Comment: occasionally     Social History     Substance and Sexual Activity   Drug Use Never     Social History     Tobacco Use   Smoking Status Former    Current packs/day: 1.00    Average packs/day: 1 pack/day for 3.0 years (3.0 ttl pk-yrs)    Types: Cigarettes   Smokeless Tobacco Never   Tobacco Comments    I smoked [id number]; and then [id number]     Family History   Problem Relation Age of Onset    Miscarriages / Stillbirths Mother         1979  "   Thyroid disease Mother         hypothyroid    Celiac disease Mother     Hypothyroidism Mother     Liver disease Mother         Dx with fatty live 2/2024    COPD Father     No Known Problems Sister     No Known Problems Daughter     Asthma Brother     Addiction problem Brother         In rehab for drug use    No Known Problems Brother     No Known Problems Son     No Known Problems Maternal Aunt     Breast cancer Paternal Aunt 60    Pancreatic cancer Paternal Uncle     Breast cancer Other     Bipolar disorder Brother     Addiction problem Brother         In rehab for alcoholism       Meds/Allergies       Current Outpatient Medications:     Cholecalciferol 50 MCG (2000 UT) TABS    levothyroxine 88 mcg tablet    No Known Allergies        Objective     Blood pressure 110/72, temperature 98.8 °F (37.1 °C), temperature source Tympanic, height 5' 5.5\" (1.664 m), weight 82.3 kg (181 lb 6.4 oz). Body mass index is 29.73 kg/m².      PHYSICAL EXAM:      General Appearance:   Alert, cooperative, no distress   HEENT:   Normocephalic, atraumatic, anicteric.     Neck:  Supple, symmetrical, trachea midline   Lungs:   Clear to auscultation bilaterally; no rales, rhonchi or wheezing; respirations unlabored    Heart::   Regular rate and rhythm; no murmur, rub, or gallop.   Abdomen:   Soft, non-tender, non-distended; normal bowel sounds; no masses, no organomegaly    Genitalia:   Deferred    Rectal:   Deferred    Extremities:  No cyanosis, clubbing or edema    Pulses:  2+ and symmetric    Skin:  No jaundice, rashes, or lesions    Lymph nodes:  No palpable cervical lymphadenopathy        Lab Results:   No visits with results within 1 Day(s) from this visit.   Latest known visit with results is:   Hospital Outpatient Visit on 04/25/2024   Component Date Value    EXT Preg Test, Ur 04/25/2024 Negative     Control 04/25/2024 Valid     Case Report 04/25/2024                      Value:Surgical Pathology Report                         " Case: [id number]                                  Authorizing Provider:  Devyn Rodriguez MD      Collected:           04/25/2024 1528              Ordering Location:     [facility]        Received:            04/25/2024 [address] [place] [address] Endoscopy                                                     Pathologist:           Shaylee Ocasio MD                                                    Specimens:   A) - Small Bowel, NOS, duodenal bx r/o ciliac                                                       B) - Stomach, gastric bx r/o h. pylori                                                              C) - Esophagus, distal esophageal bx r/o EOE                                                        D) - Esophagus, proximal esophageal bx r/o EOE                                             Final Diagnosis 04/25/2024                      Value:This result contains rich text formatting which cannot be displayed here.    Additional Information 04/25/2024                      Value:This result contains rich text formatting which cannot be displayed here.    Gross Description 04/25/2024                      Value:This result contains rich text formatting which cannot be displayed here.    Case Report 04/25/2024                      Value:Surgical Pathology Report                         Case: [id number]                                  Authorizing Provider:  Devyn Rodriguez MD      Collected:           04/25/2024 1528              Ordering Location:     [facility]        Received:            04/25/2024 [address] [place] [address] Endoscopy                                                     Pathologist:           Shaylee Ocasio MD                                                    Specimens:   A) - Small Bowel, NOS, duodenal bx r/o ciliac                                                       B) - Stomach, gastric bx r/o  h. pylori                                                              C) - Esophagus, distal esophageal bx r/o EOE                                                        D) - Esophagus, proximal esophageal bx r/o EOE                                             Final Diagnosis 04/25/2024                      Value:This result contains rich text formatting which cannot be displayed here.    Additional Information 04/25/2024                      Value:This result contains rich text formatting which cannot be displayed here.    Gross Description 04/25/2024                      Value:This result contains rich text formatting which cannot be displayed here.         Radiology Results:   No results found.

## 2024-06-24 ENCOUNTER — APPOINTMENT (OUTPATIENT)
Dept: LAB | Facility: CLINIC | Age: 44
End: 2024-06-24

## 2024-06-24 DIAGNOSIS — Z00.6 ENCOUNTER FOR EXAMINATION FOR NORMAL COMPARISON OR CONTROL IN CLINICAL RESEARCH PROGRAM: ICD-10-CM

## 2024-06-24 PROCEDURE — 36415 COLL VENOUS BLD VENIPUNCTURE: CPT

## 2024-07-06 LAB
APOB+LDLR+PCSK9 GENE MUT ANL BLD/T: NOT DETECTED
BRCA1+BRCA2 DEL+DUP + FULL MUT ANL BLD/T: NOT DETECTED
MLH1+MSH2+MSH6+PMS2 GN DEL+DUP+FUL M: NOT DETECTED

## 2024-08-30 ENCOUNTER — HOSPITAL ENCOUNTER (OUTPATIENT)
Dept: MAMMOGRAPHY | Facility: HOSPITAL | Age: 44
Discharge: HOME | End: 2024-08-30
Payer: COMMERCIAL

## 2024-08-30 VITALS — WEIGHT: 181 LBS | BODY MASS INDEX: 29.09 KG/M2 | HEIGHT: 66 IN

## 2024-08-30 DIAGNOSIS — Z12.31 ENCOUNTER FOR SCREENING MAMMOGRAM FOR BREAST CANCER: ICD-10-CM

## 2024-08-30 PROCEDURE — 77067 SCR MAMMO BI INCL CAD: CPT

## 2024-08-30 PROCEDURE — 77063 BREAST TOMOSYNTHESIS BI: CPT

## 2024-09-01 DIAGNOSIS — Z12.31 ENCOUNTER FOR SCREENING MAMMOGRAM FOR BREAST CANCER: Primary | ICD-10-CM

## 2024-09-18 ENCOUNTER — RA CDI HCC (OUTPATIENT)
Dept: OTHER | Facility: HOSPITAL | Age: 44
End: 2024-09-18

## 2024-09-18 ENCOUNTER — OFFICE VISIT (OUTPATIENT)
Dept: FAMILY MEDICINE CLINIC | Facility: CLINIC | Age: 44
End: 2024-09-18
Payer: COMMERCIAL

## 2024-09-18 VITALS
DIASTOLIC BLOOD PRESSURE: 80 MMHG | RESPIRATION RATE: 18 BRPM | SYSTOLIC BLOOD PRESSURE: 122 MMHG | HEART RATE: 65 BPM | TEMPERATURE: 98.4 F | BODY MASS INDEX: 28.61 KG/M2 | HEIGHT: 66 IN | OXYGEN SATURATION: 98 % | WEIGHT: 178 LBS

## 2024-09-18 DIAGNOSIS — J06.9 UPPER RESPIRATORY TRACT INFECTION, UNSPECIFIED TYPE: Primary | ICD-10-CM

## 2024-09-18 PROCEDURE — 99213 OFFICE O/P EST LOW 20 MIN: CPT | Performed by: FAMILY MEDICINE

## 2024-09-18 NOTE — ASSESSMENT & PLAN NOTE
Likely seasonal allergies, possible viral as well. Supportive care and symptom directed treatment. Follow up if worsening.

## 2024-09-18 NOTE — PROGRESS NOTES
"Ambulatory Visit  Name: Porsche Crow      : 1980      MRN: [id number]  Encounter Provider: Cat Minaya DO  Encounter Date: 2024   Encounter department: [facility]    Assessment & Plan  Upper respiratory tract infection, unspecified type  Likely seasonal allergies, possible viral as well. Supportive care and symptom directed treatment. Follow up if worsening.          History of Present Illness     Sore Throat   This is a new problem. The current episode started in the past 7 days. The problem has been waxing and waning. The pain is worse on the left side. There has been no fever. The pain is at a severity of 5/10. The pain is moderate. Associated symptoms include congestion, coughing, a hoarse voice, a plugged ear sensation and swollen glands. Pertinent negatives include no abdominal pain, diarrhea, drooling, ear discharge, ear pain, headaches, shortness of breath, stridor, trouble swallowing or vomiting. She has had exposure to strep.     Thought she was having allergies but was exposed to strep a couple of weeks ago. Sounds hoarse. Started with rhinorrhea, now has postnasal drainage, sleeps with a fan on her face. Feels like it is moving down to the throat. Sore throat, coughing a bit. Nasal congestion.         Review of Systems   HENT:  Positive for congestion, hoarse voice and sore throat. Negative for drooling, ear discharge, ear pain and trouble swallowing.    Respiratory:  Positive for cough. Negative for shortness of breath and stridor.    Gastrointestinal:  Negative for abdominal pain, diarrhea and vomiting.   Neurological:  Negative for headaches.           Objective     /80 (BP Location: Left arm, Patient Position: Sitting, Cuff Size: Standard)   Pulse 65   Temp 98.4 °F (36.9 °C) (Temporal)   Resp 18   Ht 5' 5.5\" (1.664 m)   Wt 80.7 kg (178 lb)   LMP 2024 (Exact Date)   SpO2 98%   BMI 29.17 kg/m²     Physical Exam  Vitals reviewed. "   Constitutional:       Appearance: Normal appearance.   HENT:      Right Ear: Tympanic membrane, ear canal and external ear normal.      Left Ear: Tympanic membrane, ear canal and external ear normal.      Nose: Congestion present.      Mouth/Throat:      Mouth: Mucous membranes are moist.      Pharynx: Posterior oropharyngeal erythema present. No oropharyngeal exudate.   Eyes:      Extraocular Movements: Extraocular movements intact.      Conjunctiva/sclera: Conjunctivae normal.   Cardiovascular:      Rate and Rhythm: Normal rate and regular rhythm.      Heart sounds: Normal heart sounds.   Pulmonary:      Effort: Pulmonary effort is normal.      Breath sounds: Normal breath sounds.   Abdominal:      General: Abdomen is flat.   Neurological:      Mental Status: She is alert.   Psychiatric:         Mood and Affect: Mood normal.         Behavior: Behavior normal.

## 2024-09-27 ENCOUNTER — HOSPITAL ENCOUNTER (OUTPATIENT)
Dept: RADIOLOGY | Facility: HOSPITAL | Age: 44
Discharge: HOME | End: 2024-09-27
Payer: COMMERCIAL

## 2024-09-27 ENCOUNTER — OFFICE VISIT (OUTPATIENT)
Dept: FAMILY MEDICINE CLINIC | Facility: CLINIC | Age: 44
End: 2024-09-27
Payer: COMMERCIAL

## 2024-09-27 ENCOUNTER — APPOINTMENT (OUTPATIENT)
Dept: LAB | Facility: CLINIC | Age: 44
End: 2024-09-27
Payer: COMMERCIAL

## 2024-09-27 VITALS
HEIGHT: 66 IN | SYSTOLIC BLOOD PRESSURE: 124 MMHG | DIASTOLIC BLOOD PRESSURE: 76 MMHG | HEART RATE: 86 BPM | OXYGEN SATURATION: 97 % | RESPIRATION RATE: 18 BRPM | WEIGHT: 175.4 LBS | BODY MASS INDEX: 28.19 KG/M2 | TEMPERATURE: 97.8 F

## 2024-09-27 DIAGNOSIS — M79.672 PAIN IN BOTH FEET: ICD-10-CM

## 2024-09-27 DIAGNOSIS — G89.29 CHRONIC RIGHT-SIDED LOW BACK PAIN WITHOUT SCIATICA: ICD-10-CM

## 2024-09-27 DIAGNOSIS — E01.0 THYROMEGALY: ICD-10-CM

## 2024-09-27 DIAGNOSIS — M54.50 CHRONIC RIGHT-SIDED LOW BACK PAIN WITHOUT SCIATICA: ICD-10-CM

## 2024-09-27 DIAGNOSIS — E03.9 ACQUIRED HYPOTHYROIDISM: ICD-10-CM

## 2024-09-27 DIAGNOSIS — M79.671 PAIN IN BOTH FEET: ICD-10-CM

## 2024-09-27 DIAGNOSIS — Z00.00 ENCOUNTER FOR WELLNESS EXAMINATION IN ADULT: Primary | ICD-10-CM

## 2024-09-27 PROBLEM — J06.9 UPPER RESPIRATORY TRACT INFECTION: Status: RESOLVED | Noted: 2024-09-18 | Resolved: 2024-09-27

## 2024-09-27 LAB
T4 FREE SERPL-MCNC: 0.96 NG/DL (ref 0.61–1.12)
TSH SERPL DL<=0.05 MIU/L-ACNC: 3.99 UIU/ML (ref 0.45–4.5)

## 2024-09-27 PROCEDURE — 99396 PREV VISIT EST AGE 40-64: CPT | Performed by: FAMILY MEDICINE

## 2024-09-27 PROCEDURE — 73630 X-RAY EXAM OF FOOT: CPT

## 2024-09-27 PROCEDURE — 99213 OFFICE O/P EST LOW 20 MIN: CPT | Performed by: FAMILY MEDICINE

## 2024-09-27 PROCEDURE — 84439 ASSAY OF FREE THYROXINE: CPT

## 2024-09-27 PROCEDURE — 72110 X-RAY EXAM L-2 SPINE 4/>VWS: CPT

## 2024-09-27 PROCEDURE — 84443 ASSAY THYROID STIM HORMONE: CPT

## 2024-09-27 PROCEDURE — 36415 COLL VENOUS BLD VENIPUNCTURE: CPT

## 2024-09-27 RX ORDER — MELOXICAM 15 MG/1
TABLET ORAL
Qty: 30 TABLET | Refills: 3 | Status: SHIPPED | OUTPATIENT
Start: 2024-09-27 | End: 2024-10-02

## 2024-09-27 NOTE — PROGRESS NOTES
Ambulatory Visit  Name: Porsche Crow      : 1980      MRN: [id number]  Encounter Provider: Fela Garcia MD  Encounter Date: 2024   Encounter department: [facility]    Assessment & Plan  Encounter for wellness examination in adult  The patient is up-to-date with mammogram, colonoscopy and is scheduled for GYN exam.       Acquired hypothyroidism    Orders:    TSH, 3rd generation; Future    T4, free; Future  Llevothyroxone 88 mcg daily  Hair loss and fatigue lately  Labs to be drawn today  Chronic right-sided low back pain without sciatica  Chronic low back pain.  Primarily right-sided lately.  Worsening of symptoms.  Positive for chronic rising on the right on today's examination.  Referral to PT.  Proceed with x-ray.  Start meloxicam.  Consider MRI lumbar spine if symptoms are not improving.  Orders:    XR spine lumbar minimum 4 views non injury; Future    Ambulatory referral to Physical Therapy; Future    meloxicam (Mobic) 15 mg tablet; Take 1 tablet once a day after food as needed for back pain /joint pain    Pain in both feet  Chronic arthritic pain in both feet.  Pending consultation with podiatry.  Proceed with x-rays.  Orders:    XR foot 2 vw left; Future    XR foot 2 vw right; Future    Thyromegaly  No palpable nodules.  Proceed with ultrasound.  Monitor TSH.  Levothyroxine 88 mcg daily  Orders:    US thyroid; Future       Patient Instructions   Mobic ( anti-inflammatory) take daily for 1-2 weeks then as needed  X - Rays  Start PT  Foot roller  If back pain persists after PT-  please call, we will consider MRI  Thyroid ultrasound    History of Present Illness      Well exam   UTD with mammo    UTD with colonoscopy 2020, [facility]   GYN-pending follow-up 10/25/24 , menses are regular  Pending podiatry evaluation  EGD - unremarkable, previous right upper quadrant ultrasound was normal, no evidence of gallstones      Chronic low back pain since  ,  used to follow with the chiropractor - not anymore    Low back pain is worse lately - now describes it as right sided low back pressure  Pain with walking or sitting to long   R>Left  No sciatica  No groin pain  No bowel or bladder dysfunction  No previous testing     Pain with starting to walk - left foot and stiffness in achilles        Back Pain  This is a chronic problem. The current episode started more than 1 year ago. The problem occurs daily. The problem has been waxing and waning since onset. The pain is present in the sacro-iliac. The quality of the pain is described as stabbing. The pain does not radiate. The pain is at a severity of 7/10. The pain is Worse during the day. The symptoms are aggravated by standing. Stiffness is present All day. Pertinent negatives include no abdominal pain, bladder incontinence, bowel incontinence, chest pain, dysuria, fever, headaches, leg pain, numbness, paresis, paresthesias, pelvic pain, perianal numbness, tingling, weakness or weight loss. Risk factors include lack of exercise, obesity and poor posture.     Review of Systems   Constitutional:  Positive for fatigue. Negative for fever and weight loss.   HENT: Negative.     Eyes: Negative.    Respiratory: Negative.     Cardiovascular: Negative.  Negative for chest pain.   Gastrointestinal:  Negative for abdominal pain and bowel incontinence.   Endocrine: Negative.    Genitourinary:  Negative for bladder incontinence, dysuria and pelvic pain.   Musculoskeletal:  Positive for arthralgias and back pain.   Allergic/Immunologic: Negative.    Neurological:  Negative for tingling, weakness, numbness, headaches and paresthesias.   Hematological: Negative.    Psychiatric/Behavioral: Negative.       Past Medical History:   Diagnosis Date    Anxiety     Diagnosed last November in counseling    BRCA1 negative     BRCA2 negative     Disease of thyroid gland     GERD (gastroesophageal reflux disease)     I've had this issue off and on  for most of my adult life.    Hypothyroidism 2010    Varicella     I had this when I was 3 I think.     Past Surgical History:   Procedure Laterality Date    BREAST CYST EXCISION Left     2020- done in [place]- neg    BREAST SURGERY Left     Bleeding Duct removed     SECTION  2001     SECTION  2006    COLONOSCOPY  2021    SPINE SURGERY      C6-7 Fushion Aug 2010    TUBAL LIGATION       Family History   Problem Relation Age of Onset    Miscarriages / Stillbirths Mother         1979    Thyroid disease Mother         hypothyroid    Celiac disease Mother     Hypothyroidism Mother     Liver disease Mother         Dx with fatty live 2024    COPD Father     No Known Problems Sister     No Known Problems Daughter     No Known Problems Maternal Grandmother     No Known Problems Maternal Grandfather     No Known Problems Paternal Grandmother     No Known Problems Paternal Grandfather     Asthma Brother     Addiction problem Brother         In rehab for drug use    No Known Problems Brother     Bipolar disorder Brother     Addiction problem Brother         In rehab for alcoholism    No Known Problems Son     No Known Problems Maternal Aunt     Breast cancer Paternal Aunt 60    Pancreatic cancer Paternal Uncle     Breast cancer Other      Social History     Tobacco Use    Smoking status: Former     Current packs/day: 1.00     Average packs/day: 1 pack/day for 3.0 years (3.0 ttl pk-yrs)     Types: Cigarettes    Smokeless tobacco: Never    Tobacco comments:     I smoked [id number]; and then [id number]   Vaping Use    Vaping status: Never Used   Substance and Sexual Activity    Alcohol use: Yes     Alcohol/week: 6.0 standard drinks of alcohol     Types: 6 Cans of beer per week     Comment: occasionally    Drug use: Never    Sexual activity: Yes     Partners: Male     Birth control/protection: Female Sterilization     Comment: Tubes tied in      Current Outpatient Medications on File Prior  "to Visit   Medication Sig    Cholecalciferol 50 MCG (2000 UT) TABS Take 2,000 Units by mouth daily    levothyroxine 88 mcg tablet TAKE 1 TABLET (88 MCG TOTAL) BY MOUTH DAILY IN THE EARLY MORNING     No Known Allergies  Immunization History   Administered Date(s) Administered    COVID-19 PFIZER VACCINE 0.3 ML IM 05/14/2021, 06/04/2021    Tdap 03/25/2015     Objective     /76 (BP Location: Left arm, Patient Position: Sitting, Cuff Size: Standard)   Pulse 86   Temp 97.8 °F (36.6 °C) (Temporal)   Resp 18   Ht 5' 5.5\" (1.664 m)   Wt 79.6 kg (175 lb 6.4 oz)   LMP 08/25/2024 (Exact Date)   SpO2 97%   BMI 28.74 kg/m²     Physical Exam  Vitals and nursing note reviewed.   Constitutional:       General: She is not in acute distress.     Appearance: Normal appearance. She is well-developed. She is not ill-appearing.   HENT:      Head: Normocephalic and atraumatic.   Eyes:      General: No scleral icterus.     Conjunctiva/sclera: Conjunctivae normal.   Neck:      Thyroid: Thyromegaly present. No thyroid mass or thyroid tenderness.      Vascular: No carotid bruit.   Cardiovascular:      Rate and Rhythm: Normal rate and regular rhythm.      Heart sounds: Normal heart sounds. No murmur heard.  Pulmonary:      Effort: Pulmonary effort is normal. No respiratory distress.      Breath sounds: Normal breath sounds.   Abdominal:      General: Bowel sounds are normal. There is no abdominal bruit.      Palpations: Abdomen is soft.      Tenderness: There is no abdominal tenderness. There is no guarding.      Hernia: No hernia is present.   Musculoskeletal:      Cervical back: Full passive range of motion without pain and neck supple. No rigidity.      Right lower leg: No edema.      Left lower leg: No edema.      Comments: Positive SLR right at  50-60 degrees, tenderness at  L3- L4 and L4- L5 and right  > left SI joint, mild trochanteric tenderness BL   Skin:     General: Skin is warm.   Neurological:      General: No focal " deficit present.      Mental Status: She is alert and oriented to person, place, and time.   Psychiatric:         Mood and Affect: Mood normal.         Behavior: Behavior normal.         Thought Content: Thought content normal.

## 2024-09-27 NOTE — PATIENT INSTRUCTIONS
Mobic ( anti-inflammatory) take daily for 1-2 weeks then as needed  X - Rays  Start PT  Foot roller  If back pain persists after PT-  please call, we will consider MRI  Thyroid ultrasound

## 2024-09-27 NOTE — ASSESSMENT & PLAN NOTE
Orders:    TSH, 3rd generation; Future    T4, free; Future  Llevothyroxone 88 mcg daily  Hair loss and fatigue lately  Labs to be drawn today

## 2024-09-30 ENCOUNTER — TELEPHONE (OUTPATIENT)
Dept: FAMILY MEDICINE CLINIC | Facility: CLINIC | Age: 44
End: 2024-09-30

## 2024-09-30 DIAGNOSIS — E03.9 ACQUIRED HYPOTHYROIDISM: ICD-10-CM

## 2024-09-30 RX ORDER — LEVOTHYROXINE SODIUM 100 UG/1
100 TABLET ORAL
Qty: 100 TABLET | Refills: 0 | Status: SHIPPED | OUTPATIENT
Start: 2024-09-30

## 2024-10-01 NOTE — TELEPHONE ENCOUNTER
Please contact the patient.    Recent thyroid function testing is normal but again is trending towards underactive thyroid    I do recommend to increase dose of levothyroxine from 88 to 100 mcg daily.  I think she will notice improvement of fatigue on this slightly higher dose.      I will send Rx to the pharmacy and we will ask patient to repeat her thyroid function test in 2 months. Orders placed.    Thank you

## 2024-10-02 ENCOUNTER — TELEPHONE (OUTPATIENT)
Age: 44
End: 2024-10-02

## 2024-10-02 DIAGNOSIS — M54.41 CHRONIC RIGHT-SIDED LOW BACK PAIN WITH RIGHT-SIDED SCIATICA: Primary | ICD-10-CM

## 2024-10-02 DIAGNOSIS — G89.29 CHRONIC RIGHT-SIDED LOW BACK PAIN WITH RIGHT-SIDED SCIATICA: Primary | ICD-10-CM

## 2024-10-02 NOTE — TELEPHONE ENCOUNTER
Received call from patient regarding the mobic. States that she started it over the weekend and has developed a rash around her right wrist. She wants to know if she should continue or what she should do. Please advise.

## 2024-10-02 NOTE — TELEPHONE ENCOUNTER
If patient has developed rash after trial of meloxicam-I recommend to stop it.    I will prescribe alternative medication that she can try at nighttime, muscle relaxant, tizanidine.    Please let patient know that x-ray of her right and left foot were normal.    X-ray of lumbar spine revealed age-appropriate arthritic changes.  I am hopeful that physical therapy along with muscle relaxant should help to alleviate chronic back pain.    New Rx was sent to the pharmacy.    Thank you

## 2024-10-14 ENCOUNTER — OFFICE VISIT (OUTPATIENT)
Dept: PODIATRY | Facility: CLINIC | Age: 44
End: 2024-10-14
Payer: COMMERCIAL

## 2024-10-14 VITALS
SYSTOLIC BLOOD PRESSURE: 113 MMHG | WEIGHT: 181 LBS | BODY MASS INDEX: 29.09 KG/M2 | HEART RATE: 82 BPM | DIASTOLIC BLOOD PRESSURE: 74 MMHG | HEIGHT: 66 IN

## 2024-10-14 DIAGNOSIS — L84 CALLUS: ICD-10-CM

## 2024-10-14 DIAGNOSIS — M24.573 EQUINUS CONTRACTURE OF ANKLE: Primary | ICD-10-CM

## 2024-10-14 DIAGNOSIS — M19.072 ARTHRITIS OF BOTH MIDFEET: ICD-10-CM

## 2024-10-14 DIAGNOSIS — M19.071 ARTHRITIS OF BOTH MIDFEET: ICD-10-CM

## 2024-10-14 DIAGNOSIS — B35.3 TINEA PEDIS OF BOTH FEET: ICD-10-CM

## 2024-10-14 PROCEDURE — 99203 OFFICE O/P NEW LOW 30 MIN: CPT | Performed by: PODIATRIST

## 2024-10-14 RX ORDER — SULFAMETHOXAZOLE AND TRIMETHOPRIM 800; 160 MG/1; MG/1
TABLET ORAL
COMMUNITY
Start: 2024-09-02 | End: 2024-10-25 | Stop reason: ALTCHOICE

## 2024-10-14 RX ORDER — CLOTRIMAZOLE AND BETAMETHASONE DIPROPIONATE 10; .64 MG/G; MG/G
CREAM TOPICAL 2 TIMES DAILY
Qty: 15 G | Refills: 1 | Status: SHIPPED | OUTPATIENT
Start: 2024-10-14 | End: 2024-11-11

## 2024-10-14 NOTE — PATIENT INSTRUCTIONS
Patient Education     Calf Stretches   About this topic   Your calf muscles can get tight and are often injured while playing sports. Calf stretches help to make the muscles longer. There are many ways to stretch the calf muscles.  General   Before starting with a program, ask your doctor if you are healthy enough to do these exercises. Your doctor may have you work with a  or physical therapist to make a safe exercise program to meet your needs.  Stretching Exercises   Stretching exercises keep your muscles flexible. They also stop them from getting tight. Start by doing each of these stretches 2 to 3 times. In order for your body to make changes, you will need to hold these stretches for 20 to 30 seconds. Repeat each exercise 2 to 3 times each day. Do all exercises slowly.  Calf stretches standing ? Stand about 12 to 18 inches (30 to 45 cm) away from a wall. Place your hands on the wall at shoulder level. Lean forward.  Knee straight - Stretch your left leg straight behind you. Make sure the left heel is flat on the floor and the left knee is straight. Now, bend the knee of the right leg until you feel a stretch in your left calf. Be sure that the heel does not come up. Repeat on the other side.  Knee bent - Take a small step forward with your left leg so your feet are slightly closer together. With your right leg bent, bend your left knee forward until you feel a stretch in the back of the calf of your left leg. This will feel strange, but it is the best way to stretch this calf muscle. Repeat on the other side.  Calf stretches seated with belt or towel ? Sit on a chair or on the floor with your legs straight out in front of you. Loop a belt or towel around the ball of one foot. Pull on the towel until you feel a stretch in the back of your calf. Repeat on the other foot.  Calf stretches lying down with belt or towel ? Lie on your back with both legs straight. Loop a belt or towel around the ball of one  foot. Lift the leg up, keeping the knee straight until you feel a stretch in the back of your thigh. Pull down on the belt or towel to bend your foot more for a better stretch. Repeat on the other foot. This stretch gets both your calf and the hamstrings on the back of your thigh.  Calf stretches on stairs ? Stand on a step and hold onto a rail. Position your feet so only the balls of your feet are on the step. Lower both heels until you feel a stretch in the back of your calves. Do not do this stretch if you have trouble with balance.  Counter stretches ? Stand a few feet away from a counter. Put your hands on the counter, shoulder width apart. Lean forward and bend your elbows as if you were doing a push-up on the counter. Be sure to keep your back straight and your heels flat on the floor. You should feel a stretch in the back of your calves.             What will the results be?   Less pain and stiffness  Better flexibility and range of motion  Less cramping  Better ankle mobility  Easier to walk and do other activities  Less chance of injury during sports  Helpful tips   Stay active and work out to keep your muscles strong and flexible.  Keep a healthy weight so there is not extra stress on your joints. Eat a healthy diet to keep your muscles healthy.  Be sure you do not hold your breath when exercising. This can raise your blood pressure. If you tend to hold your breath, try counting out loud when exercising. If any exercise bothers you, stop right away.  Always warm up before stretching. Heated muscles stretch much easier than cool muscles. Stretching cool muscles can lead to injury.  Try walking or cycling at an easy pace for a few minutes to warm up your muscles. Do this again after exercising.  Never bounce when doing stretches.  Doing exercises before a meal may be a good way to get into a routine.  Exercise may be slightly uncomfortable, but you should not have sharp pains. If you do get sharp pains,  stop what you are doing. If the sharp pains continue, call your doctor.  Last Reviewed Date   2021-07-26  Consumer Information Use and Disclaimer   This generalized information is a limited summary of diagnosis, treatment, and/or medication information. It is not meant to be comprehensive and should be used as a tool to help the user understand and/or assess potential diagnostic and treatment options. It does NOT include all information about conditions, treatments, medications, side effects, or risks that may apply to a specific patient. It is not intended to be medical advice or a substitute for the medical advice, diagnosis, or treatment of a health care provider based on the health care provider's examination and assessment of a patient’s specific and unique circumstances. Patients must speak with a health care provider for complete information about their health, medical questions, and treatment options, including any risks or benefits regarding use of medications. This information does not endorse any treatments or medications as safe, effective, or approved for treating a specific patient. [employer] and its affiliates disclaim any warranty or liability relating to this information or the use thereof. The use of this information is governed by the Terms of Use, available at https://www.woltersCoaxisuwer.com/en/know/clinical-effectiveness-terms   Copyright   Copyright © 2024 [employer], Inc[employer] and its affiliates and/or licensors. All rights reserved.    My recommendation for over-the-counter inserts for you are:    PowerStep Pall Mall Insoles    These can be obtained directly from the  at www.Sellplextep.com/pinnacle    You can also find these online at [employer], [employer] and other retailers.

## 2024-10-14 NOTE — PROGRESS NOTES
Name: Porsche Crow      : 1980      MRN: [id number]  Encounter Provider: Jose Santiago DPM  Encounter Date: 10/14/2024   Encounter department: [facility] PODIATRY [facility]    Assessment & Plan     1. Equinus contracture of ankle  2. Tinea pedis of both feet  -     clotrimazole-betamethasone (LOTRISONE) 1-0.05 % cream; Apply topically 2 (two) times a day for 28 days Apply thin layer to affected area twice daily.  3. Callus  4. Arthritis of both midfeet      Stretches, orthotics.  Lotrisone.    Return in 2 months.    Reviewed x-rays with the patient of the right and left foot.  There is some mild midfoot arthritis noted bilaterally no acute fractures or dislocations noted currently.    I personally discussed with patient at the visit today:     The diagnosis of this encounter  2.   Possible etiologies of the diagnosis  3.   Treatment options including advantages and disadvantages of treatment with potential risks and complications associated with these treatments  4.   Prevention strategies and ways to decrease pain     I answered all of the patients questions.      Return in about 2 months (around 2024).    Subjective     Patient has some discomfort under the third toe right foot.  She denies any trauma to the area.  She does have callus formation noted to the forefoot.  Also notices some peeling and erythematous changes bilaterally.    Patient is complaining of lump underneath the third toe on the right foot.  She had x-rays that were completed.  She had started Mobic she was having a rash around her right wrist.  She had recommended stopping the medication and starting a nighttime tizanidine.  There is lateral deviation of the hallux with prominent medial eminence noted.   No acute fractures or dislocations noted currently.    3rd toe sees swelling under the 3rd toe and has some peeling on the bottom of the foot.      Constitutional:  Negative for chills and fever.  "  Respiratory:  Negative for chest tightness and shortness of breath.    Gastrointestinal:  Negative for nausea and vomiting.     Current Outpatient Medications on File Prior to Visit   Medication Sig    Cholecalciferol 50 MCG (2000 UT) TABS Take 2,000 Units by mouth daily    levothyroxine 100 mcg tablet Take 1 tablet (100 mcg total) by mouth daily in the early morning    tiZANidine (ZANAFLEX) 4 mg tablet Take 1 tablet (4 mg total) by mouth daily at bedtime as needed for muscle spasms    sulfamethoxazole-trimethoprim (BACTRIM DS) 800-160 mg per tablet  (Patient not taking: Reported on 10/14/2024)       Objective     /74   Pulse 82   Ht 5' 5.5\" (1.664 m)   Wt 82.1 kg (181 lb)   BMI 29.66 kg/m²     Vascular: Intact pedal pulses bilateral DP and PT.  Neurological: Gross protective sensation intact bilateral  Musculoskeletal: Muscle strength bilateral intact with dorsiflexion, inversion, eversion and plantarflexion.  Limitation range of motion in dorsiflexion noted with knee extended.  Knee flexed does improve this.  She does have callus formation noted on the plantar surface of the forefoot.  There is some mild discomfort noted on palpation of the third metatarsophalangeal joint plantarly.  Dermatological: No open lesions or ulcerations noted bilateral.  Mild peeling and erythematous changes noted to the webspace as well as on the plantar surface of the foot.    "

## 2024-10-15 ENCOUNTER — EVALUATION (OUTPATIENT)
Dept: PHYSICAL THERAPY | Facility: REHABILITATION | Age: 44
End: 2024-10-15
Payer: COMMERCIAL

## 2024-10-15 DIAGNOSIS — M54.50 CHRONIC RIGHT-SIDED LOW BACK PAIN WITHOUT SCIATICA: Primary | ICD-10-CM

## 2024-10-15 DIAGNOSIS — G89.29 CHRONIC RIGHT-SIDED LOW BACK PAIN WITHOUT SCIATICA: Primary | ICD-10-CM

## 2024-10-15 PROCEDURE — 97110 THERAPEUTIC EXERCISES: CPT | Performed by: PHYSICAL THERAPIST

## 2024-10-15 PROCEDURE — 97530 THERAPEUTIC ACTIVITIES: CPT | Performed by: PHYSICAL THERAPIST

## 2024-10-15 PROCEDURE — 97161 PT EVAL LOW COMPLEX 20 MIN: CPT | Performed by: PHYSICAL THERAPIST

## 2024-10-15 NOTE — PROGRESS NOTES
PT Evaluation     Today's date: 10/15/2024  Patient name: Porsche Crow  : 1980  MRN: [id number]  Referring provider: Fela Garcia MD  Dx:   Encounter Diagnosis     ICD-10-CM    1. Chronic right-sided low back pain without sciatica  M54.50 Ambulatory referral to Physical Therapy    G89.29                      Assessment  Impairments: abnormal muscle tone, abnormal or restricted ROM, abnormal movement, activity intolerance, impaired physical strength, lacks appropriate home exercise program and pain with function  Functional limitations: Difficulty w/ prolonged sitting/walking, lifting/carrying/pushing/pulling objects    Assessment details: Porsche is a 44 y.o. female presenting to PT w/ history of R sided lower back pain. Pt would benefit from skilled PT services to address the below listed impairments and functional limitations to encourage return to PLOF.           Goals  STG: Lumbar ROM improved by at least 25% in all deficient planes in 4 weeks.   STG: Subjectively reports pain at worst decreased by 2 points in 4 weeks.   STG: I w/ HEPs 1 week after prescription.   LTG: Washes dishes w/o pain by d/c.   LTG: FOTO >= to goal by d/c.   LTG: I w/ comprehensive HEP by d/c.         Plan  Patient would benefit from: PT eval and skilled physical therapy  Planned modality interventions: TENS, thermotherapy: hydrocollator packs and cryotherapy    Planned therapy interventions: IASTM, joint mobilization, kinesiology taping, manual therapy, massage, patient/caregiver education, nerve gliding, neuromuscular re-education, balance, flexibility, functional ROM exercises, gait training, graded activity, graded motor, graded exercise and home exercise program    Frequency: 1x week  Duration in weeks: 8  Plan of Care beginning date: 10/15/2024  Plan of Care expiration date: 12/10/2024  Treatment plan discussed with: patient      Subjective Evaluation    History of Present Illness  Mechanism of injury: Back pain  for years. Slowly getting worse. Leaning over bothers it such as standing and washing dishes, washing the tub. Feels she has to stretch or sit to help. Had chiropractic care in the past, most recently in . Felt that it helped but was just kind of covering up the problem. She rides her bike which is doable. She used to run but stopped because of back pain. Sitting down too long can bother her. No numbness or tingling. Tried some home stretches w/o relief. Symptoms are more on the right side, sometimes across the lower back.   Patient Goals  Patient goals for therapy: decreased pain, increased motion, increased strength and return to sport/leisure activities    Pain  Current pain ratin  At best pain ratin  At worst pain ratin  Quality: throbbing, tight and dull ache          Objective     Concurrent Complaints  Negative for night pain and disturbed sleep    Neurological Testing     Sensation     Lumbar   Left   Intact: light touch    Right   Intact: light touch    Active Range of Motion     Lumbar   Flexion:  Restriction level: minimal  Extension:  Restriction level: moderate  Left lateral flexion:  Restriction level: minimal  Right lateral flexion:  Restriction level: minimal  Left rotation:  Restriction level: minimal  Right rotation:  Restriction level: minimal    Strength/Myotome Testing     Lumbar   Left   Normal strength    Right   Normal strength    General Comments:      Lumbar Comments  REIL w/ self OP - D, B (Standing ext ROM improved and painless)            Precautions: N/A      Manuals 10/15                                                                Neuro Re-Ed 10/15            Bridge w/ hip abd HEP                                                                                          Ther Ex 10/15            REIL w/ self OP                                                                                                        Ther Activity 10/15            Education Seated posture,  HEP 10'                         Gait Training                                       Modalities

## 2024-10-22 ENCOUNTER — OFFICE VISIT (OUTPATIENT)
Dept: PHYSICAL THERAPY | Facility: REHABILITATION | Age: 44
End: 2024-10-22
Payer: COMMERCIAL

## 2024-10-22 DIAGNOSIS — M54.50 CHRONIC RIGHT-SIDED LOW BACK PAIN WITHOUT SCIATICA: Primary | ICD-10-CM

## 2024-10-22 DIAGNOSIS — G89.29 CHRONIC RIGHT-SIDED LOW BACK PAIN WITHOUT SCIATICA: Primary | ICD-10-CM

## 2024-10-22 PROCEDURE — 97112 NEUROMUSCULAR REEDUCATION: CPT | Performed by: PHYSICAL THERAPIST

## 2024-10-22 NOTE — PROGRESS NOTES
Daily Note     Today's date: 10/22/2024  Patient name: Porsche Crow  : 1980  MRN: [id number]  Referring provider: Fela Garcia MD  Dx:   Encounter Diagnosis     ICD-10-CM    1. Chronic right-sided low back pain without sciatica  M54.50     G89.29                      Subjective: Slightly better.     Objective: See treatment diary below    REIL w/ hips off center L - D, A (RSGIS painless and full ROM)     Assessment: HEP updated as listed below given response to repeated movements. Would benefit from continued skilled PT services to reduce pain and increase level of function.     Plan: Continue per plan of care.      Precautions: N/A      Manuals 10/15 10/22                                                               Neuro Re-Ed 10/15 10/22           Bridge w/ hip abd HEP YHB 3x8           Pallof press  BTB 2x8 - HEP           Sidesteps  3x laps YMB - HEP                                                               Ther Ex 10/15 10/22            REIL w/ self OP  10x           REIL w/ hips off center L  20x - HEP                                                                                          Ther Activity 10/15            Education Seated posture, HEP 10'                         Gait Training                                       Modalities

## 2024-10-25 ENCOUNTER — OFFICE VISIT (OUTPATIENT)
Age: 44
End: 2024-10-25
Payer: COMMERCIAL

## 2024-10-25 ENCOUNTER — HOSPITAL ENCOUNTER (OUTPATIENT)
Dept: ULTRASOUND IMAGING | Facility: HOSPITAL | Age: 44
Discharge: HOME | End: 2024-10-25
Payer: COMMERCIAL

## 2024-10-25 VITALS
WEIGHT: 178.6 LBS | SYSTOLIC BLOOD PRESSURE: 118 MMHG | DIASTOLIC BLOOD PRESSURE: 64 MMHG | HEIGHT: 65 IN | BODY MASS INDEX: 29.76 KG/M2

## 2024-10-25 DIAGNOSIS — E01.0 THYROMEGALY: ICD-10-CM

## 2024-10-25 DIAGNOSIS — Z01.419 ENCOUNTER FOR ANNUAL ROUTINE GYNECOLOGICAL EXAMINATION: Primary | ICD-10-CM

## 2024-10-25 DIAGNOSIS — N39.0 POSTCOITAL UTI: ICD-10-CM

## 2024-10-25 PROCEDURE — S0612 ANNUAL GYNECOLOGICAL EXAMINA: HCPCS | Performed by: OBSTETRICS & GYNECOLOGY

## 2024-10-25 PROCEDURE — 76536 US EXAM OF HEAD AND NECK: CPT

## 2024-10-25 RX ORDER — ESTRADIOL 0.1 MG/G
CREAM VAGINAL
Qty: 42.5 G | Refills: 1 | Status: SHIPPED | OUTPATIENT
Start: 2024-10-25

## 2024-10-25 NOTE — PROGRESS NOTES
Assessment/Plan:     1. Encounter for annual routine gynecological examination      2. Postcoital UTI    - estradiol (ESTRACE VAGINAL) 0.1 mg/g vaginal cream; Apply externally at bedtime every night,  Dispense: 42.5 g; Refill: 1        Subjective      Porsche Crow is a 44 y.o. female who presents for annual exam. Periods are regular but getting closer together.  Notes more post-coital Uti's and difficulty achieving orgasm.  No breast concerns.    Current contraception: tubal ligation  History of abnormal Pap smear: no  Regular self breast exam: yes  History of abnormal mammogram: no  History of abnormal lipids: no    Menstrual History:    Menarche age: 9  Patient's last menstrual period was 10/15/2024 (exact date).  Period Cycle (Days): 25  Period Duration (Days): 4-5  Period Pattern: Regular  Menstrual Flow: Moderate  Menstrual Control: Other (Comment), Maxi pad (disc)  Dysmenorrhea: (!) Moderate  Dysmenorrhea Symptoms: Cramping    Past Medical History:   Diagnosis Date    Anxiety     Diagnosed last November in counseling    BRCA1 negative     BRCA2 negative     Disease of thyroid gland     GERD (gastroesophageal reflux disease)     I've had this issue off and on for most of my adult life.    Hypothyroidism 2010    Varicella     I had this when I was 3 I think.       Family History   Problem Relation Age of Onset    Miscarriages / Stillbirths Mother         1979    Thyroid disease Mother         hypothyroid    Celiac disease Mother     Hypothyroidism Mother     Liver disease Mother         Dx with fatty live 2024    Skin cancer Mother     COPD Father     No Known Problems Sister     Asthma Brother     Addiction problem Brother         In rehab for drug use    No Known Problems Brother     Bipolar disorder Brother     Addiction problem Brother         In rehab for alcoholism    No Known Problems Daughter     No Known Problems Son     No Known Problems Maternal Grandmother     No Known Problems Maternal  "Grandfather     No Known Problems Paternal Grandmother     No Known Problems Paternal Grandfather     No Known Problems Maternal Aunt     Breast cancer Paternal Aunt 60    Pancreatic cancer Paternal Uncle     Breast cancer Other        The following portions of the patient's history were reviewed and updated as appropriate: allergies, current medications, past family history, past medical history, past social history, past surgical history, and problem list.    Review of Systems  Pertinent items are noted in HPI.      Objective      /64 (BP Location: Right arm, Patient Position: Sitting, Cuff Size: Large)   Ht 5' 5.25\" (1.657 m)   Wt 81 kg (178 lb 9.6 oz)   LMP 10/15/2024 (Exact Date)   BMI 29.49 kg/m²     General:   alert and oriented, in no acute distress   Heart:  Breasts: regular rate and rhythm  appear normal, no suspicious masses, no skin or nipple changes or axillary nodes.   Lungs: Effort normal   Abdomen: soft, non-tender, without masses or organomegaly   Vulva: Normal; mild atrophy   Vagina: normal mucosa   Cervix: no lesions   Uterus: normal size, mobile, non-tender   Adnexa:  Bladder: normal adnexa and no mass, fullness, tenderness  Non-tender, no prolapse               "

## 2024-10-29 ENCOUNTER — OFFICE VISIT (OUTPATIENT)
Dept: PHYSICAL THERAPY | Facility: REHABILITATION | Age: 44
End: 2024-10-29
Payer: COMMERCIAL

## 2024-10-29 DIAGNOSIS — G89.29 CHRONIC RIGHT-SIDED LOW BACK PAIN WITHOUT SCIATICA: Primary | ICD-10-CM

## 2024-10-29 DIAGNOSIS — M54.50 CHRONIC RIGHT-SIDED LOW BACK PAIN WITHOUT SCIATICA: Primary | ICD-10-CM

## 2024-10-29 PROCEDURE — 97530 THERAPEUTIC ACTIVITIES: CPT | Performed by: PHYSICAL THERAPIST

## 2024-10-29 PROCEDURE — 97112 NEUROMUSCULAR REEDUCATION: CPT | Performed by: PHYSICAL THERAPIST

## 2024-10-29 NOTE — PROGRESS NOTES
Daily Note     Today's date: 10/29/2024  Patient name: Porsche Crow  : 1980  MRN: [id number]  Referring provider: Fela Garcia MD  Dx:   Encounter Diagnosis     ICD-10-CM    1. Chronic right-sided low back pain without sciatica  M54.50     G89.29                      Subjective: L knee was swollen from exercises. Maybe some improvement in back.     Objective: See treatment diary below    REIL w/ self OP - D, B (ext ROM improved and non painful palpation to R side lumbar spine)     Assessment: Completed exercise with correct technique and without reports of pain. Demonstrated moderate fatigue after exercise. Pt would benefit from continued PT services to reduce pain and increase level of function.     Plan: Continue per plan of care.      Precautions: N/A      Manuals 10/15 10/22 10/29                                                              Neuro Re-Ed 10/15 10/22 10/29          Bridge w/ hip abd HEP YHB 3x8           Pallof press  BTB 2x8 - HEP BTB 2x10 ea          Sidesteps  3x laps YMB - HEP           Standing hip abd   BTB 2x8 ea - HEP          Standing hip ext   BTB 2x8 ea - HEP          Shoulder taps   3x6 ea                       Ther Ex 10/15 10/22  10/29          REIL w/ self OP  10x 10x          REIL w/ hips off center L  20x - HEP                                                                                          Ther Activity 10/15  10/29          Education Seated posture, HEP 10'  HEP, symptom response to exercise 10'                        Gait Training                                       Modalities

## 2024-10-30 ENCOUNTER — TELEPHONE (OUTPATIENT)
Dept: FAMILY MEDICINE CLINIC | Facility: CLINIC | Age: 44
End: 2024-10-30

## 2024-10-30 NOTE — TELEPHONE ENCOUNTER
----- Message from SANA Olson sent at 10/30/2024  5:14 PM EDT -----  Thyroid ultrasound shows normal sized thyroid gland, with no thyroid nodules. The gland appearance is consistent with underactive thyroid.   Please call with any questions.

## 2024-11-19 ENCOUNTER — OFFICE VISIT (OUTPATIENT)
Dept: PHYSICAL THERAPY | Facility: REHABILITATION | Age: 44
End: 2024-11-19
Payer: COMMERCIAL

## 2024-11-19 DIAGNOSIS — G89.29 CHRONIC RIGHT-SIDED LOW BACK PAIN WITHOUT SCIATICA: Primary | ICD-10-CM

## 2024-11-19 DIAGNOSIS — M54.50 CHRONIC RIGHT-SIDED LOW BACK PAIN WITHOUT SCIATICA: Primary | ICD-10-CM

## 2024-11-19 PROCEDURE — 97140 MANUAL THERAPY 1/> REGIONS: CPT

## 2024-11-19 PROCEDURE — 97110 THERAPEUTIC EXERCISES: CPT

## 2024-11-19 NOTE — PROGRESS NOTES
Daily Note     Today's date: 2024  Patient name: Porsche Crow  : 1980  MRN: [id number]  Referring provider: Fela Garcia MD  Dx:   Encounter Diagnosis     ICD-10-CM    1. Chronic right-sided low back pain without sciatica  M54.50     G89.29           Start Time: 0800  Stop Time: 0839  Total time in clinic (min): 39 minutes    Subjective: Patient reports improvements in symptoms with HEP. Feels the REIL seems to be the most beneficial. Patient reports symptoms are most noticeable upon standing after being seated at home or in car and standing for extended periods while doing things around the house.      Objective: See treatment diary below  R SG: p, nb/nw (3/10 pain)  L SG: p, nb/nw (4/10 pain)    Assessment: Tolerated treatment well today. Produced pain with sideglides and JAKI over table. JAKI did improve lumbar AROM and symptoms. Updated HEP see below. Patient would benefit from continued PT      Plan: Continue per plan of care.      Precautions: N/A      Manuals 10/15 10/22 10/29 11/19                                                Re-Assessment    PRR         Neuro Re-Ed 10/15 10/22 10/29 11/19         Bridge w/ hip abd HEP YHB 3x8           Pallof press  BTB 2x8 - HEP BTB 2x10 ea          Sidesteps  3x laps YMB - HEP           Standing hip abd   BTB 2x8 ea - HEP          Standing hip ext   BTB 2x8 ea - HEP          Shoulder taps   3x6 ea                       Ther Ex 10/15 10/22  10/29 11/19         REIL w/ self OP  10x 10x 10x         REIL w/ hips off center L  20x - HEP            REIL w/ PT OP    1x5         REIL w/ PT OP hips off center L    2x5         JAKI             JAKI w/ hips off center to L    2x5 HEP                                   Ther Activity 10/15  10/29 11/19         Education Seated posture, HEP 10'  HEP, symptom response to exercise 10'  HEP PRR 10'                      Gait Training                                       Modalities

## 2024-11-22 ENCOUNTER — PATIENT MESSAGE (OUTPATIENT)
Dept: FAMILY MEDICINE CLINIC | Facility: CLINIC | Age: 44
End: 2024-11-22

## 2024-11-26 ENCOUNTER — APPOINTMENT (OUTPATIENT)
Dept: PHYSICAL THERAPY | Facility: REHABILITATION | Age: 44
End: 2024-11-26
Payer: COMMERCIAL

## 2024-11-26 DIAGNOSIS — E55.9 VITAMIN D DEFICIENCY: Primary | ICD-10-CM

## 2024-12-02 ENCOUNTER — APPOINTMENT (OUTPATIENT)
Dept: LAB | Facility: CLINIC | Age: 44
End: 2024-12-02
Payer: COMMERCIAL

## 2024-12-02 DIAGNOSIS — E03.9 ACQUIRED HYPOTHYROIDISM: ICD-10-CM

## 2024-12-02 DIAGNOSIS — E55.9 VITAMIN D DEFICIENCY: ICD-10-CM

## 2024-12-02 LAB
25(OH)D3 SERPL-MCNC: 41.2 NG/ML (ref 30–100)
TSH SERPL DL<=0.05 MIU/L-ACNC: 3.4 UIU/ML (ref 0.45–4.5)

## 2024-12-02 PROCEDURE — 82306 VITAMIN D 25 HYDROXY: CPT

## 2024-12-02 PROCEDURE — 84443 ASSAY THYROID STIM HORMONE: CPT

## 2024-12-02 PROCEDURE — 36415 COLL VENOUS BLD VENIPUNCTURE: CPT

## 2024-12-04 ENCOUNTER — OFFICE VISIT (OUTPATIENT)
Dept: PHYSICAL THERAPY | Facility: REHABILITATION | Age: 44
End: 2024-12-04
Payer: COMMERCIAL

## 2024-12-04 DIAGNOSIS — G89.29 CHRONIC RIGHT-SIDED LOW BACK PAIN WITHOUT SCIATICA: Primary | ICD-10-CM

## 2024-12-04 DIAGNOSIS — M54.50 CHRONIC RIGHT-SIDED LOW BACK PAIN WITHOUT SCIATICA: Primary | ICD-10-CM

## 2024-12-04 PROCEDURE — 97530 THERAPEUTIC ACTIVITIES: CPT | Performed by: PHYSICAL THERAPIST

## 2024-12-04 PROCEDURE — 97140 MANUAL THERAPY 1/> REGIONS: CPT | Performed by: PHYSICAL THERAPIST

## 2024-12-04 NOTE — PROGRESS NOTES
Daily Note     Today's date: 2024  Patient name: Porsche Crow  : 1980  MRN: [id number]  Referring provider: Fela Garcia MD  Dx:   Encounter Diagnosis     ICD-10-CM    1. Chronic right-sided low back pain without sciatica  M54.50     G89.29                      Subjective: On and off w/ improvement.     Objective: See treatment diary below    Assessment: HEP updated as listed below. Sustained flexion improves flexion ROM and does not affect ext ROM. Ext ROM normal today. VC for hip extension to avoid excessive lumbar ext which was producing LBP. Would benefit from continued PT.     Plan: Continue per plan of care.      Precautions: N/A      Manuals 10/15 10/22 10/29 11/19 12/4                                                Re-Assessment    PRR         Neuro Re-Ed 10/15 10/22 10/29 11/19 12/4        Bridge w/ hip abd HEP YHB 3x8           Pallof press  BTB 2x8 - HEP BTB 2x10 ea  Reviewed         Sidesteps  3x laps YMB - HEP           Standing hip abd   BTB 2x8 ea - HEP          Standing hip ext   BTB 2x8 ea - HEP  20x in flexion, 10x in neutral         Shoulder taps   3x6 ea          Partial curl up     3x8 - HEP        Ther Ex 10/15 10/22  10/29 11/19 12/4        REIL w/ self OP  10x 10x 10x         REIL w/ hips off center L  20x - HEP            REIL w/ PT OP    1x5         REIL w/ PT OP hips off center L    2x5         JAKI             JAKI w/ hips off center to L    2x5 HEP         SFIL     1x2' - HEP                      Ther Activity 10/15  10/29 11/19 12/4        Education Seated posture, HEP 10'  HEP, symptom response to exercise 10'  HEP PRR 10' Regarding HEP and PoC 10'                     Gait Training                                       Modalities

## 2024-12-10 ENCOUNTER — TELEPHONE (OUTPATIENT)
Dept: ADMINISTRATIVE | Facility: OTHER | Age: 44
End: 2024-12-10

## 2024-12-10 NOTE — TELEPHONE ENCOUNTER
----- Message from Gena MARTINEZ sent at 12/9/2024 12:17 PM EST -----  Regarding: Care Gap Request  12/09/24 12:17 PM    Hello, our patient attached above has had CRC: Colonoscopy completed/performed. Please assist in updating the patient chart by pulling the document from the Media Tab. The date of service is 7/21/2020.     Thank you,  Gena Multani MA  [facility]

## 2024-12-11 ENCOUNTER — HOSPITAL ENCOUNTER (OUTPATIENT)
Dept: RADIOLOGY | Facility: HOSPITAL | Age: 44
Discharge: HOME | End: 2024-12-11
Payer: COMMERCIAL

## 2024-12-11 ENCOUNTER — OFFICE VISIT (OUTPATIENT)
Dept: FAMILY MEDICINE CLINIC | Facility: CLINIC | Age: 44
End: 2024-12-11
Payer: COMMERCIAL

## 2024-12-11 VITALS
TEMPERATURE: 98.4 F | SYSTOLIC BLOOD PRESSURE: 126 MMHG | WEIGHT: 181.6 LBS | BODY MASS INDEX: 33.42 KG/M2 | HEART RATE: 82 BPM | DIASTOLIC BLOOD PRESSURE: 84 MMHG | RESPIRATION RATE: 18 BRPM | OXYGEN SATURATION: 97 % | HEIGHT: 62 IN

## 2024-12-11 DIAGNOSIS — M25.461 PAIN AND SWELLING OF RIGHT KNEE: ICD-10-CM

## 2024-12-11 DIAGNOSIS — M25.561 PAIN AND SWELLING OF RIGHT KNEE: Primary | ICD-10-CM

## 2024-12-11 DIAGNOSIS — M25.561 PAIN AND SWELLING OF RIGHT KNEE: ICD-10-CM

## 2024-12-11 DIAGNOSIS — M25.461 PAIN AND SWELLING OF RIGHT KNEE: Primary | ICD-10-CM

## 2024-12-11 PROCEDURE — 73562 X-RAY EXAM OF KNEE 3: CPT

## 2024-12-11 PROCEDURE — 99213 OFFICE O/P EST LOW 20 MIN: CPT | Performed by: FAMILY MEDICINE

## 2024-12-11 NOTE — TELEPHONE ENCOUNTER
Upon review of the In Basket request we were able to locate, review, and update the patient chart as requested for CRC: Colonoscopy.    Any additional questions or concerns should be emailed to the Practice Liaisons via the appropriate education email address, please do not reply via In Basket.    Thank you  Adela Hurd MA   [facility]

## 2024-12-11 NOTE — PROGRESS NOTES
"Name: Porsche Crow      : 1980      MRN: [id number]  Encounter Provider: Cat Minaya DO  Encounter Date: 2024   Encounter department: [facility]  :  Assessment & Plan  Pain and swelling of right knee  Plans to try Aleve to replace Ibuprofen. Will get XR to determine if any arthritic changes, may proceed with injection if that is the case. If no arthritis findings would recommend MRI given activities and PT have only exacerbated her symptoms. Follow up with imaging results when available.  Orders:  •  XR knee 3 vw right non injury; Future           History of Present Illness     HPI  R knee swelling, has been intermittent for years, maybe a couple of times a year previously. Has been doing PT for low back pain, was doing squatting exercises and developed swelling after that - this was a few months ago. Swelling is not painful but was persistent for the week at that time. 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 Now developed swelling again earlier this week, now somewhat painful, feels like it is going up into her thigh, some pain with stairs. Feels tight. No locking but does have clicking with stairs, no giving out. Has tried anti-inflammatories, elevated it yesterday, put some ice on it. Has been going to the gym again recently, walking on tredmill. Only has pain with stairs but not walking normally, it only feels stiff then. No previous injuries to the R knee. No previous imaging of the knees. Did feel some clicking in her L knee with stairs this morning but no pain or swelling.     Review of Systems    Objective   /84 (BP Location: Left arm, Patient Position: Sitting, Cuff Size: Standard)   Pulse 82   Temp 98.4 °F (36.9 °C) (Temporal)   Resp 18   Ht 5' 2\" (1.575 m)   Wt 82.4 kg (181 lb 9.6 oz)   LMP  (LMP Unknown)   SpO2 97%   BMI 33.22 kg/m²      Physical Exam  R knee " swelling, cracking and crepitus bilaterally on extension. Normal ROM but feels tight. No overlying skin changes.

## 2024-12-16 ENCOUNTER — RESULTS FOLLOW-UP (OUTPATIENT)
Dept: FAMILY MEDICINE CLINIC | Facility: CLINIC | Age: 44
End: 2024-12-16

## 2024-12-16 DIAGNOSIS — M25.461 PAIN AND SWELLING OF RIGHT KNEE: Primary | ICD-10-CM

## 2024-12-16 DIAGNOSIS — M25.561 PAIN AND SWELLING OF RIGHT KNEE: Primary | ICD-10-CM

## 2024-12-18 ENCOUNTER — OFFICE VISIT (OUTPATIENT)
Dept: FAMILY MEDICINE CLINIC | Facility: CLINIC | Age: 44
End: 2024-12-18
Payer: COMMERCIAL

## 2024-12-18 VITALS
HEART RATE: 69 BPM | BODY MASS INDEX: 33.68 KG/M2 | TEMPERATURE: 98 F | WEIGHT: 183 LBS | DIASTOLIC BLOOD PRESSURE: 80 MMHG | OXYGEN SATURATION: 98 % | HEIGHT: 62 IN | RESPIRATION RATE: 16 BRPM | SYSTOLIC BLOOD PRESSURE: 118 MMHG

## 2024-12-18 DIAGNOSIS — G89.29 CHRONIC RIGHT-SIDED LOW BACK PAIN WITH RIGHT-SIDED SCIATICA: ICD-10-CM

## 2024-12-18 DIAGNOSIS — M25.569 ANTERIOR KNEE PAIN, UNSPECIFIED LATERALITY: Primary | ICD-10-CM

## 2024-12-18 DIAGNOSIS — E03.9 ACQUIRED HYPOTHYROIDISM: ICD-10-CM

## 2024-12-18 DIAGNOSIS — Z13.220 ENCOUNTER FOR SCREENING FOR LIPID DISORDER: ICD-10-CM

## 2024-12-18 DIAGNOSIS — M54.41 CHRONIC RIGHT-SIDED LOW BACK PAIN WITH RIGHT-SIDED SCIATICA: ICD-10-CM

## 2024-12-18 PROCEDURE — 99213 OFFICE O/P EST LOW 20 MIN: CPT | Performed by: FAMILY MEDICINE

## 2024-12-18 NOTE — ASSESSMENT & PLAN NOTE
Thyroid ultrasound October 2024:Heterogeneous thyroid gland, with no discrete nodules appreciated.     Recent TFTs are normal.  Continue levothyroxine 100 mcg daily, repeat labs in 4 to 5 months.  Orders:  •  CBC and differential; Future  •  Comprehensive metabolic panel; Future  •  TSH, 3rd generation; Future  •  T4, free; Future

## 2024-12-18 NOTE — PROGRESS NOTES
Name: Porsche Crow      : 1980      MRN: [id number]  Encounter Provider: Fela Garcia MD  Encounter Date: 2024   Encounter department: [facility]    Assessment & Plan  Acquired hypothyroidism  Thyroid ultrasound 2024:Heterogeneous thyroid gland, with no discrete nodules appreciated.     Recent TFTs are normal.  Continue levothyroxine 100 mcg daily, repeat labs in 4 to 5 months.  Orders:  •  CBC and differential; Future  •  Comprehensive metabolic panel; Future  •  TSH, 3rd generation; Future  •  T4, free; Future    Anterior knee pain, unspecified laterality  Patient presents for evaluation of anterior knee pain, likely prepatellar bursitis/effusion that has significantly improved since last office visit.  I recommend to schedule evaluation by [facility] sports medicine.  Orders:  •  Ambulatory Referral to Orthopedic Surgery; Future    Chronic right-sided low back pain with right-sided sciatica  I recommend to start tizanidine and magnesium oxide.  Patient presents with significant low back spasm.  Referral to sports medicine.  Continue PT.  Orders:  •  Ambulatory Referral to Orthopedic Surgery; Future    Encounter for screening for lipid disorder    Orders:  •  Lipid Panel with Direct LDL reflex; Future       Patient Instructions   Please schedule consultation with [facility] sports medicine (Dr. Toledo, Dr. Story, Dr. Meyers).  Please discuss knee pain and back pain  I strongly recommend to start taking tizanidine, muscle relaxant, at night.  It will be especially helpful the night before PT and the night after PT  If joint pains will be recurring - we will proceed with further evaluation by running inflammatory panel  Please start Magnesium tablets (magnesium oxide 500 mg daily), great supplement for muscle health and muscle tightness  Full panel blood work in spring 2025, April-May, 12 hour fasting    History of Present Illness     Follow-up   Patient  started physical therapy for low back pain that has triggered knee pain, right more than left.  Patient was evaluated in the office by Dr. Minaya, had x-ray and is currently scheduled for right knee MRI in early January.  Patient reports the right knee was significantly swollen during last office visit, swelling has diminished by now.    Results of recent blood work reviewed.  TFTs are normal.  Vitamin D level is normal.      Review of Systems   Constitutional: Negative.    Respiratory: Negative.     Cardiovascular: Negative.    Genitourinary: Negative.    Musculoskeletal:  Positive for arthralgias and back pain.     Past Medical History:   Diagnosis Date   • Anxiety     Diagnosed last November in counseling   • BRCA1 negative    • BRCA2 negative    • Disease of thyroid gland    • GERD (gastroesophageal reflux disease)     I've had this issue off and on for most of my adult life.   • Hypothyroidism 2010   • Varicella     I had this when I was 3 I think.     Past Surgical History:   Procedure Laterality Date   • BREAST CYST EXCISION Left     2020- done in [place]- neg   • BREAST SURGERY Left     Bleeding Duct removed   •  SECTION  2001   •  SECTION  2006   • COLONOSCOPY  2021   • SPINE SURGERY      C6-7 Fushion Aug 2010   • TUBAL LIGATION       Family History   Problem Relation Age of Onset   • Miscarriages / Stillbirths Mother            • Thyroid disease Mother         hypothyroid   • Celiac disease Mother    • Hypothyroidism Mother    • Liver disease Mother         Dx with fatty live 2024   • Skin cancer Mother    • COPD Father    • No Known Problems Sister    • Asthma Brother    • Addiction problem Brother         In rehab for drug use   • No Known Problems Brother    • Bipolar disorder Brother    • Addiction problem Brother         In rehab for alcoholism   • No Known Problems Daughter    • No Known Problems Son    • No Known Problems Maternal Grandmother    • No  "Known Problems Maternal Grandfather    • No Known Problems Paternal Grandmother    • No Known Problems Paternal Grandfather    • No Known Problems Maternal Aunt    • Breast cancer Paternal Aunt 60   • Pancreatic cancer Paternal Uncle    • Breast cancer Other      Social History     Tobacco Use   • Smoking status: Former     Current packs/day: 1.00     Average packs/day: 1 pack/day for 3.0 years (3.0 ttl pk-yrs)     Types: Cigarettes   • Smokeless tobacco: Never   • Tobacco comments:     I smoked [id number]; and then 4309-1355   Vaping Use   • Vaping status: Never Used   Substance and Sexual Activity   • Alcohol use: Yes     Alcohol/week: 6.0 standard drinks of alcohol     Types: 6 Cans of beer per week     Comment: occasionally   • Drug use: Never   • Sexual activity: Yes     Partners: Male     Birth control/protection: Female Sterilization     Comment: Tubes tied in 2006     Current Outpatient Medications on File Prior to Visit   Medication Sig   • Cholecalciferol 50 MCG (2000 UT) TABS Take 2,000 Units by mouth daily   • estradiol (ESTRACE VAGINAL) 0.1 mg/g vaginal cream Apply externally at bedtime every night,   • levothyroxine 100 mcg tablet Take 1 tablet (100 mcg total) by mouth daily in the early morning   • tiZANidine (ZANAFLEX) 4 mg tablet Take 1 tablet (4 mg total) by mouth daily at bedtime as needed for muscle spasms     No Known Allergies  Immunization History   Administered Date(s) Administered   • COVID-19 PFIZER VACCINE 0.3 ML IM 05/14/2021, 06/04/2021   • Tdap 03/25/2015     Objective   /80 (BP Location: Left arm, Patient Position: Sitting, Cuff Size: Standard)   Pulse 69   Temp 98 °F (36.7 °C) (Temporal)   Resp 16   Ht 5' 2\" (1.575 m)   Wt 83 kg (183 lb)   LMP  (LMP Unknown)   SpO2 98%   BMI 33.47 kg/m²     Physical Exam  Vitals and nursing note reviewed.   Constitutional:       General: She is not in acute distress.     Appearance: Normal appearance. She is not ill-appearing. "   Musculoskeletal:         General: Normal range of motion.      Comments: Right knee: Mild prepatellar effusion.  Tenderness at medial meniscus.  No warmth, no discoloration.  No calf tenderness.  Full range of motion.   Neurological:      General: No focal deficit present.      Mental Status: She is alert and oriented to person, place, and time.   Psychiatric:         Mood and Affect: Mood normal.         Behavior: Behavior normal.         Thought Content: Thought content normal.

## 2024-12-18 NOTE — PATIENT INSTRUCTIONS
Please schedule consultation with [facility] sports medicine (Dr. Toledo, Dr. Story, Dr. Meyers).  Please discuss knee pain and back pain  I strongly recommend to start taking tizanidine, muscle relaxant, at night.  It will be especially helpful the night before PT and the night after PT  If joint pains will be recurring - we will proceed with further evaluation by running inflammatory panel  Please start Magnesium tablets (magnesium oxide 500 mg daily), great supplement for muscle health and muscle tightness  Full panel blood work in spring 2025, April-May, 12 hour fasting

## 2024-12-18 NOTE — ASSESSMENT & PLAN NOTE
I recommend to start tizanidine and magnesium oxide.  Patient presents with significant low back spasm.  Referral to sports medicine.  Continue PT.  Orders:  •  Ambulatory Referral to Orthopedic Surgery; Future

## 2024-12-19 ENCOUNTER — OFFICE VISIT (OUTPATIENT)
Dept: PHYSICAL THERAPY | Facility: REHABILITATION | Age: 44
End: 2024-12-19
Payer: COMMERCIAL

## 2024-12-19 DIAGNOSIS — G89.29 CHRONIC RIGHT-SIDED LOW BACK PAIN WITHOUT SCIATICA: Primary | ICD-10-CM

## 2024-12-19 DIAGNOSIS — M54.50 CHRONIC RIGHT-SIDED LOW BACK PAIN WITHOUT SCIATICA: Primary | ICD-10-CM

## 2024-12-19 PROCEDURE — 97110 THERAPEUTIC EXERCISES: CPT | Performed by: PHYSICAL THERAPIST

## 2024-12-19 PROCEDURE — 97530 THERAPEUTIC ACTIVITIES: CPT | Performed by: PHYSICAL THERAPIST

## 2024-12-19 NOTE — PROGRESS NOTES
Daily Note     Today's date: 2024  Patient name: Porsche Crow  : 1980  MRN: [id number]  Referring provider: Fela Garcia MD  Dx:   Encounter Diagnosis     ICD-10-CM    1. Chronic right-sided low back pain without sciatica  M54.50     G89.29                      Subjective: She has been going back to the gym. Her knee swelled up after doing squats, maybe from PT exercises as well. Her back bothers her if she sits for a while.     Objective: See treatment diary below    Assessment: Consistent better response w/ REIL w/ self OP, W w/ RFIS. Given this response, reinforced increased frequency of exercise as well as adherence to upright seated posture if this continues to bring on symptoms. Would benefit form continued PT.     Plan: Continue per plan of care.      Precautions: N/A      Manuals 10/15 10/22 10/29 11/19 12/4  12/19                                              Re-Assessment    PRR         Neuro Re-Ed 10/15 10/22 10/29 11/19 12/4 12/19       Bridge w/ hip abd HEP YHB 3x8           Pallof press  BTB 2x8 - HEP BTB 2x10 ea  Reviewed  Reviewed, sitting if bothers knee       Sidesteps  3x laps YMB - HEP           Standing hip abd   BTB 2x8 ea - HEP          Standing hip ext   BTB 2x8 ea - HEP  20x in flexion, 10x in neutral         Shoulder taps   3x6 ea          Partial curl up     3x8 - HEP        Ther Ex 10/15 10/22  10/29 11/19 12/4 12/19       REIL w/ self OP  10x 10x 10x  40x through session       REIL w/ hips off center L  20x - HEP            REIL w/ PT OP    1x5         REIL w/ PT OP hips off center L    2x5         JAKI             JAKI w/ hips off center to L    2x5 HEP         SFIL     1x2' - HEP                      Ther Activity 10/15  10/29 11/19 12/4 12/19       Education Seated posture, HEP 10'  HEP, symptom response to exercise 10'  HEP PRR 10' Regarding HEP and PoC 10' Regarding seated posture and HEP 10'                    Gait Training                                        Modalities

## 2024-12-31 ENCOUNTER — OFFICE VISIT (OUTPATIENT)
Dept: OBGYN CLINIC | Facility: OTHER | Age: 44
End: 2024-12-31
Payer: COMMERCIAL

## 2024-12-31 VITALS — HEIGHT: 66 IN | WEIGHT: 183 LBS | BODY MASS INDEX: 29.41 KG/M2

## 2024-12-31 DIAGNOSIS — M54.41 CHRONIC RIGHT-SIDED LOW BACK PAIN WITH RIGHT-SIDED SCIATICA: ICD-10-CM

## 2024-12-31 DIAGNOSIS — M25.569 ANTERIOR KNEE PAIN, UNSPECIFIED LATERALITY: ICD-10-CM

## 2024-12-31 DIAGNOSIS — G89.29 CHRONIC RIGHT-SIDED LOW BACK PAIN WITH RIGHT-SIDED SCIATICA: ICD-10-CM

## 2024-12-31 PROCEDURE — 99204 OFFICE O/P NEW MOD 45 MIN: CPT | Performed by: FAMILY MEDICINE

## 2024-12-31 NOTE — PATIENT INSTRUCTIONS
Trial right knee CSI. If no satisfactory relief will consider mri knee evaluate for meniscal tear.     Recommend continue with lumbar mri and follow-up with pain management to consider spinal injection

## 2025-01-05 DIAGNOSIS — E03.9 ACQUIRED HYPOTHYROIDISM: ICD-10-CM

## 2025-01-07 RX ORDER — LEVOTHYROXINE SODIUM 100 UG/1
100 TABLET ORAL
Qty: 100 TABLET | Refills: 0 | Status: SHIPPED | OUTPATIENT
Start: 2025-01-07

## 2025-03-26 DIAGNOSIS — E03.9 ACQUIRED HYPOTHYROIDISM: ICD-10-CM

## 2025-03-27 RX ORDER — LEVOTHYROXINE SODIUM 100 UG/1
100 TABLET ORAL
Qty: 100 TABLET | Refills: 1 | Status: SHIPPED | OUTPATIENT
Start: 2025-03-27

## 2025-04-21 DIAGNOSIS — N39.0 POSTCOITAL UTI: ICD-10-CM

## 2025-04-22 RX ORDER — ESTRADIOL 0.1 MG/G
CREAM VAGINAL
Qty: 42.5 G | Refills: 1 | Status: SHIPPED | OUTPATIENT
Start: 2025-04-22

## 2025-04-28 ENCOUNTER — APPOINTMENT (OUTPATIENT)
Dept: LAB | Facility: CLINIC | Age: 45
End: 2025-04-28
Payer: COMMERCIAL

## 2025-04-28 DIAGNOSIS — Z13.220 ENCOUNTER FOR SCREENING FOR LIPID DISORDER: ICD-10-CM

## 2025-04-28 DIAGNOSIS — E03.9 ACQUIRED HYPOTHYROIDISM: ICD-10-CM

## 2025-04-28 LAB
BASOPHILS # BLD AUTO: 0.06 THOUSANDS/ÂΜL (ref 0–0.1)
BASOPHILS NFR BLD AUTO: 1 % (ref 0–1)
EOSINOPHIL # BLD AUTO: 0.19 THOUSAND/ÂΜL (ref 0–0.61)
EOSINOPHIL NFR BLD AUTO: 3 % (ref 0–6)
ERYTHROCYTE [DISTWIDTH] IN BLOOD BY AUTOMATED COUNT: 12.5 % (ref 11.6–15.1)
HCT VFR BLD AUTO: 40.3 % (ref 34.8–46.1)
HGB BLD-MCNC: 13.1 G/DL (ref 11.5–15.4)
IMM GRANULOCYTES # BLD AUTO: 0.01 THOUSAND/UL (ref 0–0.2)
IMM GRANULOCYTES NFR BLD AUTO: 0 % (ref 0–2)
LYMPHOCYTES # BLD AUTO: 1.87 THOUSANDS/ÂΜL (ref 0.6–4.47)
LYMPHOCYTES NFR BLD AUTO: 30 % (ref 14–44)
MCH RBC QN AUTO: 31.7 PG (ref 26.8–34.3)
MCHC RBC AUTO-ENTMCNC: 32.5 G/DL (ref 31.4–37.4)
MCV RBC AUTO: 98 FL (ref 82–98)
MONOCYTES # BLD AUTO: 0.52 THOUSAND/ÂΜL (ref 0.17–1.22)
MONOCYTES NFR BLD AUTO: 9 % (ref 4–12)
NEUTROPHILS # BLD AUTO: 3.5 THOUSANDS/ÂΜL (ref 1.85–7.62)
NEUTS SEG NFR BLD AUTO: 57 % (ref 43–75)
NRBC BLD AUTO-RTO: 0 /100 WBCS
PLATELET # BLD AUTO: 237 THOUSANDS/UL (ref 149–390)
PMV BLD AUTO: 10.7 FL (ref 8.9–12.7)
RBC # BLD AUTO: 4.13 MILLION/UL (ref 3.81–5.12)
WBC # BLD AUTO: 6.15 THOUSAND/UL (ref 4.31–10.16)

## 2025-04-28 PROCEDURE — 85025 COMPLETE CBC W/AUTO DIFF WBC: CPT

## 2025-04-28 PROCEDURE — 80053 COMPREHEN METABOLIC PANEL: CPT

## 2025-04-28 PROCEDURE — 84443 ASSAY THYROID STIM HORMONE: CPT

## 2025-04-28 PROCEDURE — 36415 COLL VENOUS BLD VENIPUNCTURE: CPT

## 2025-04-28 PROCEDURE — 84439 ASSAY OF FREE THYROXINE: CPT

## 2025-04-28 PROCEDURE — 80061 LIPID PANEL: CPT

## 2025-04-29 LAB
ALBUMIN SERPL BCG-MCNC: 4.2 G/DL (ref 3.5–5)
ALP SERPL-CCNC: 55 U/L (ref 34–104)
ALT SERPL W P-5'-P-CCNC: 12 U/L (ref 7–52)
ANION GAP SERPL CALCULATED.3IONS-SCNC: 6 MMOL/L (ref 4–13)
AST SERPL W P-5'-P-CCNC: 15 U/L (ref 13–39)
BILIRUB SERPL-MCNC: 0.49 MG/DL (ref 0.2–1)
BUN SERPL-MCNC: 15 MG/DL (ref 5–25)
CALCIUM SERPL-MCNC: 9.2 MG/DL (ref 8.4–10.2)
CHLORIDE SERPL-SCNC: 103 MMOL/L (ref 96–108)
CHOLEST SERPL-MCNC: 130 MG/DL (ref ?–200)
CO2 SERPL-SCNC: 30 MMOL/L (ref 21–32)
CREAT SERPL-MCNC: 0.66 MG/DL (ref 0.6–1.3)
GFR SERPL CREATININE-BSD FRML MDRD: 106 ML/MIN/1.73SQ M
GLUCOSE P FAST SERPL-MCNC: 100 MG/DL (ref 65–99)
HDLC SERPL-MCNC: 67 MG/DL
LDLC SERPL CALC-MCNC: 54 MG/DL (ref 0–100)
POTASSIUM SERPL-SCNC: 4.4 MMOL/L (ref 3.5–5.3)
PROT SERPL-MCNC: 6.6 G/DL (ref 6.4–8.4)
SODIUM SERPL-SCNC: 139 MMOL/L (ref 135–147)
T4 FREE SERPL-MCNC: 1.14 NG/DL (ref 0.61–1.12)
TRIGL SERPL-MCNC: 45 MG/DL (ref ?–150)
TSH SERPL DL<=0.05 MIU/L-ACNC: 5 UIU/ML (ref 0.45–4.5)

## 2025-05-04 ENCOUNTER — TELEPHONE (OUTPATIENT)
Dept: FAMILY MEDICINE CLINIC | Facility: CLINIC | Age: 45
End: 2025-05-04

## 2025-05-04 ENCOUNTER — RESULTS FOLLOW-UP (OUTPATIENT)
Dept: FAMILY MEDICINE CLINIC | Facility: CLINIC | Age: 45
End: 2025-05-04

## 2025-05-04 DIAGNOSIS — E03.9 ACQUIRED HYPOTHYROIDISM: Primary | ICD-10-CM

## 2025-05-05 NOTE — TELEPHONE ENCOUNTER
Please contact the patient.  I reviewed results of recent blood work.    One of her thyroid function tests, TSH, indicates that thyroid function is slightly underactive.  Second thyroid test, free T4, is on the high side of normal.    I recommend to continue current dose of levothyroxine of 100 mcg daily and repeat thyroid function testing again in 2 months.    Rest of the blood work is normal    Thank you

## 2025-05-05 NOTE — TELEPHONE ENCOUNTER
Called patient and leave a detailed message with BW results and provider message/instructions. [employer] message created and sent

## 2025-05-13 DIAGNOSIS — E03.9 ACQUIRED HYPOTHYROIDISM: ICD-10-CM

## 2025-05-13 RX ORDER — LEVOTHYROXINE SODIUM 100 UG/1
TABLET ORAL
Qty: 90 TABLET | Refills: 1 | Status: SHIPPED | OUTPATIENT
Start: 2025-05-13

## 2025-05-14 RX ORDER — LEVOTHYROXINE SODIUM 100 UG/1
100 TABLET ORAL
Qty: 100 TABLET | Refills: 1 | Status: SHIPPED | OUTPATIENT
Start: 2025-05-14 | End: 2025-08-12

## 2025-05-14 NOTE — TELEPHONE ENCOUNTER
Patient called the refill line stating that she would like [facility] Medication was sent to the correct pharmacy

## 2025-07-08 ENCOUNTER — APPOINTMENT (OUTPATIENT)
Dept: LAB | Facility: CLINIC | Age: 45
End: 2025-07-08
Payer: COMMERCIAL

## 2025-07-08 DIAGNOSIS — E03.9 ACQUIRED HYPOTHYROIDISM: ICD-10-CM

## 2025-07-08 LAB
T4 FREE SERPL-MCNC: 0.99 NG/DL (ref 0.61–1.12)
TSH SERPL DL<=0.05 MIU/L-ACNC: 3.88 UIU/ML (ref 0.45–4.5)

## 2025-07-08 PROCEDURE — 84439 ASSAY OF FREE THYROXINE: CPT

## 2025-07-08 PROCEDURE — 36415 COLL VENOUS BLD VENIPUNCTURE: CPT

## 2025-07-08 PROCEDURE — 84443 ASSAY THYROID STIM HORMONE: CPT

## 2025-07-31 ENCOUNTER — TELEPHONE (OUTPATIENT)
Age: 45
End: 2025-07-31